# Patient Record
Sex: MALE | Race: WHITE | Employment: FULL TIME | URBAN - METROPOLITAN AREA
[De-identification: names, ages, dates, MRNs, and addresses within clinical notes are randomized per-mention and may not be internally consistent; named-entity substitution may affect disease eponyms.]

---

## 2021-05-02 ENCOUNTER — APPOINTMENT (OUTPATIENT)
Dept: GENERAL RADIOLOGY | Age: 51
DRG: 177 | End: 2021-05-02
Attending: EMERGENCY MEDICINE
Payer: COMMERCIAL

## 2021-05-02 ENCOUNTER — HOSPITAL ENCOUNTER (INPATIENT)
Age: 51
LOS: 1 days | Discharge: ANOTHER HOSPITAL | DRG: 177 | End: 2021-05-03
Attending: EMERGENCY MEDICINE | Admitting: HOSPITALIST
Payer: COMMERCIAL

## 2021-05-02 DIAGNOSIS — U07.1 PNEUMONIA DUE TO COVID-19 VIRUS: Primary | ICD-10-CM

## 2021-05-02 DIAGNOSIS — J12.82 PNEUMONIA DUE TO COVID-19 VIRUS: Primary | ICD-10-CM

## 2021-05-02 PROBLEM — J96.01 ACUTE RESPIRATORY FAILURE WITH HYPOXIA (HCC): Status: ACTIVE | Noted: 2021-05-02

## 2021-05-02 PROBLEM — J96.00 ACUTE RESPIRATORY FAILURE DUE TO COVID-19 (HCC): Status: ACTIVE | Noted: 2021-05-02

## 2021-05-02 PROBLEM — I10 ESSENTIAL HYPERTENSION: Status: ACTIVE | Noted: 2021-05-02

## 2021-05-02 LAB
ABO + RH BLD: NORMAL
ALBUMIN SERPL-MCNC: 3.5 G/DL (ref 3.5–5)
ALBUMIN/GLOB SERPL: 0.9 {RATIO} (ref 1.2–3.5)
ALP SERPL-CCNC: 89 U/L (ref 50–136)
ALT SERPL-CCNC: 104 U/L (ref 12–65)
ANION GAP SERPL CALC-SCNC: 6 MMOL/L (ref 7–16)
AST SERPL-CCNC: 186 U/L (ref 15–37)
BASOPHILS # BLD: 0 K/UL (ref 0–0.2)
BASOPHILS NFR BLD: 0 % (ref 0–2)
BILIRUB DIRECT SERPL-MCNC: 0.2 MG/DL
BILIRUB SERPL-MCNC: 0.9 MG/DL (ref 0.2–1.1)
BLOOD GROUP ANTIBODIES SERPL: NORMAL
BUN SERPL-MCNC: 12 MG/DL (ref 6–23)
CALCIUM SERPL-MCNC: 8.3 MG/DL (ref 8.3–10.4)
CHLORIDE SERPL-SCNC: 105 MMOL/L (ref 98–107)
CO2 SERPL-SCNC: 27 MMOL/L (ref 21–32)
COVID-19 RAPID TEST, COVR: DETECTED
CREAT SERPL-MCNC: 0.86 MG/DL (ref 0.8–1.5)
CRP SERPL-MCNC: 11.5 MG/DL (ref 0–0.9)
D DIMER PPP FEU-MCNC: 0.57 UG/ML(FEU)
DIFFERENTIAL METHOD BLD: ABNORMAL
EOSINOPHIL # BLD: 0 K/UL (ref 0–0.8)
EOSINOPHIL NFR BLD: 0 % (ref 0.5–7.8)
ERYTHROCYTE [DISTWIDTH] IN BLOOD BY AUTOMATED COUNT: 13.8 % (ref 11.9–14.6)
GLOBULIN SER CALC-MCNC: 4.1 G/DL (ref 2.3–3.5)
GLUCOSE SERPL-MCNC: 110 MG/DL (ref 65–100)
HCT VFR BLD AUTO: 45.2 % (ref 41.1–50.3)
HGB BLD-MCNC: 15.1 G/DL (ref 13.6–17.2)
IMM GRANULOCYTES # BLD AUTO: 0 K/UL (ref 0–0.5)
IMM GRANULOCYTES NFR BLD AUTO: 0 % (ref 0–5)
LACTATE SERPL-SCNC: 0.9 MMOL/L (ref 0.4–2)
LYMPHOCYTES # BLD: 1.8 K/UL (ref 0.5–4.6)
LYMPHOCYTES NFR BLD: 24 % (ref 13–44)
MCH RBC QN AUTO: 29 PG (ref 26.1–32.9)
MCHC RBC AUTO-ENTMCNC: 33.4 G/DL (ref 31.4–35)
MCV RBC AUTO: 86.9 FL (ref 79.6–97.8)
MONOCYTES # BLD: 0.5 K/UL (ref 0.1–1.3)
MONOCYTES NFR BLD: 6 % (ref 4–12)
NEUTS SEG # BLD: 5.4 K/UL (ref 1.7–8.2)
NEUTS SEG NFR BLD: 70 % (ref 43–78)
NRBC # BLD: 0 K/UL (ref 0–0.2)
PLATELET # BLD AUTO: 186 K/UL (ref 150–450)
PMV BLD AUTO: 10 FL (ref 9.4–12.3)
POTASSIUM SERPL-SCNC: 4.1 MMOL/L (ref 3.5–5.1)
PROCALCITONIN SERPL-MCNC: 0.08 NG/ML
PROT SERPL-MCNC: 7.6 G/DL (ref 6.3–8.2)
RBC # BLD AUTO: 5.2 M/UL (ref 4.23–5.6)
SARS-COV-2, COV2: NORMAL
SODIUM SERPL-SCNC: 138 MMOL/L (ref 136–145)
SOURCE, COVRS: ABNORMAL
SPECIMEN EXP DATE BLD: NORMAL
WBC # BLD AUTO: 7.7 K/UL (ref 4.3–11.1)

## 2021-05-02 PROCEDURE — 74011250637 HC RX REV CODE- 250/637: Performed by: HOSPITALIST

## 2021-05-02 PROCEDURE — 83605 ASSAY OF LACTIC ACID: CPT

## 2021-05-02 PROCEDURE — 71045 X-RAY EXAM CHEST 1 VIEW: CPT

## 2021-05-02 PROCEDURE — 85025 COMPLETE CBC W/AUTO DIFF WBC: CPT

## 2021-05-02 PROCEDURE — 93005 ELECTROCARDIOGRAM TRACING: CPT | Performed by: EMERGENCY MEDICINE

## 2021-05-02 PROCEDURE — 85379 FIBRIN DEGRADATION QUANT: CPT

## 2021-05-02 PROCEDURE — 65270000029 HC RM PRIVATE

## 2021-05-02 PROCEDURE — 80076 HEPATIC FUNCTION PANEL: CPT

## 2021-05-02 PROCEDURE — 99285 EMERGENCY DEPT VISIT HI MDM: CPT

## 2021-05-02 PROCEDURE — 84145 PROCALCITONIN (PCT): CPT

## 2021-05-02 PROCEDURE — XW033E5 INTRODUCTION OF REMDESIVIR ANTI-INFECTIVE INTO PERIPHERAL VEIN, PERCUTANEOUS APPROACH, NEW TECHNOLOGY GROUP 5: ICD-10-PCS | Performed by: HOSPITALIST

## 2021-05-02 PROCEDURE — 86140 C-REACTIVE PROTEIN: CPT

## 2021-05-02 PROCEDURE — 74011250636 HC RX REV CODE- 250/636: Performed by: EMERGENCY MEDICINE

## 2021-05-02 PROCEDURE — 80048 BASIC METABOLIC PNL TOTAL CA: CPT

## 2021-05-02 PROCEDURE — 96374 THER/PROPH/DIAG INJ IV PUSH: CPT

## 2021-05-02 PROCEDURE — 87635 SARS-COV-2 COVID-19 AMP PRB: CPT

## 2021-05-02 PROCEDURE — 86901 BLOOD TYPING SEROLOGIC RH(D): CPT

## 2021-05-02 RX ORDER — FAMOTIDINE 20 MG/1
20 TABLET, FILM COATED ORAL 2 TIMES DAILY
Status: DISCONTINUED | OUTPATIENT
Start: 2021-05-02 | End: 2021-05-03 | Stop reason: HOSPADM

## 2021-05-02 RX ORDER — SODIUM CHLORIDE 0.9 % (FLUSH) 0.9 %
5-40 SYRINGE (ML) INJECTION AS NEEDED
Status: DISCONTINUED | OUTPATIENT
Start: 2021-05-02 | End: 2021-05-03 | Stop reason: HOSPADM

## 2021-05-02 RX ORDER — FAMOTIDINE 20 MG/1
20 TABLET, FILM COATED ORAL 2 TIMES DAILY
Status: CANCELLED | OUTPATIENT
Start: 2021-05-02

## 2021-05-02 RX ORDER — ALBUTEROL SULFATE 90 UG/1
2 AEROSOL, METERED RESPIRATORY (INHALATION)
Status: DISCONTINUED | OUTPATIENT
Start: 2021-05-03 | End: 2021-05-03 | Stop reason: SDUPTHER

## 2021-05-02 RX ORDER — ENOXAPARIN SODIUM 100 MG/ML
40 INJECTION SUBCUTANEOUS EVERY 12 HOURS
Status: DISCONTINUED | OUTPATIENT
Start: 2021-05-03 | End: 2021-05-03

## 2021-05-02 RX ORDER — DEXAMETHASONE SODIUM PHOSPHATE 100 MG/10ML
6 INJECTION INTRAMUSCULAR; INTRAVENOUS
Status: COMPLETED | OUTPATIENT
Start: 2021-05-02 | End: 2021-05-02

## 2021-05-02 RX ORDER — MELATONIN
2000 DAILY
Status: DISCONTINUED | OUTPATIENT
Start: 2021-05-03 | End: 2021-05-03 | Stop reason: HOSPADM

## 2021-05-02 RX ORDER — SODIUM CHLORIDE 9 MG/ML
250 INJECTION, SOLUTION INTRAVENOUS AS NEEDED
Status: DISCONTINUED | OUTPATIENT
Start: 2021-05-02 | End: 2021-05-03 | Stop reason: HOSPADM

## 2021-05-02 RX ORDER — GUAIFENESIN/DEXTROMETHORPHAN 100-10MG/5
5 SYRUP ORAL
Status: DISCONTINUED | OUTPATIENT
Start: 2021-05-02 | End: 2021-05-03 | Stop reason: HOSPADM

## 2021-05-02 RX ORDER — GUAIFENESIN 600 MG/1
600 TABLET, EXTENDED RELEASE ORAL EVERY 12 HOURS
Status: CANCELLED | OUTPATIENT
Start: 2021-05-02

## 2021-05-02 RX ORDER — SODIUM CHLORIDE 0.9 % (FLUSH) 0.9 %
5-40 SYRINGE (ML) INJECTION EVERY 8 HOURS
Status: DISCONTINUED | OUTPATIENT
Start: 2021-05-02 | End: 2021-05-03 | Stop reason: HOSPADM

## 2021-05-02 RX ORDER — SODIUM CHLORIDE 0.9 % (FLUSH) 0.9 %
5-40 SYRINGE (ML) INJECTION EVERY 8 HOURS
Status: CANCELLED | OUTPATIENT
Start: 2021-05-02

## 2021-05-02 RX ORDER — PROMETHAZINE HYDROCHLORIDE 25 MG/1
12.5 TABLET ORAL
Status: DISCONTINUED | OUTPATIENT
Start: 2021-05-02 | End: 2021-05-03 | Stop reason: HOSPADM

## 2021-05-02 RX ORDER — DEXAMETHASONE SODIUM PHOSPHATE 100 MG/10ML
6 INJECTION INTRAMUSCULAR; INTRAVENOUS EVERY 24 HOURS
Status: DISCONTINUED | OUTPATIENT
Start: 2021-05-03 | End: 2021-05-03 | Stop reason: HOSPADM

## 2021-05-02 RX ORDER — LISINOPRIL 20 MG/1
40 TABLET ORAL DAILY
Status: DISCONTINUED | OUTPATIENT
Start: 2021-05-03 | End: 2021-05-03 | Stop reason: HOSPADM

## 2021-05-02 RX ORDER — ACETAMINOPHEN 325 MG/1
650 TABLET ORAL
Status: CANCELLED | OUTPATIENT
Start: 2021-05-02

## 2021-05-02 RX ORDER — ASCORBIC ACID 500 MG
1000 TABLET ORAL 3 TIMES DAILY
Status: CANCELLED | OUTPATIENT
Start: 2021-05-02

## 2021-05-02 RX ORDER — ACETAMINOPHEN 650 MG/1
650 SUPPOSITORY RECTAL
Status: DISCONTINUED | OUTPATIENT
Start: 2021-05-02 | End: 2021-05-03 | Stop reason: HOSPADM

## 2021-05-02 RX ORDER — ACETAMINOPHEN 650 MG/1
650 SUPPOSITORY RECTAL
Status: CANCELLED | OUTPATIENT
Start: 2021-05-02

## 2021-05-02 RX ORDER — UREA 10 %
220 LOTION (ML) TOPICAL DAILY
Status: DISCONTINUED | OUTPATIENT
Start: 2021-05-03 | End: 2021-05-03 | Stop reason: HOSPADM

## 2021-05-02 RX ORDER — ACETAMINOPHEN 325 MG/1
650 TABLET ORAL
Status: DISCONTINUED | OUTPATIENT
Start: 2021-05-02 | End: 2021-05-03 | Stop reason: HOSPADM

## 2021-05-02 RX ORDER — GUAIFENESIN 600 MG/1
600 TABLET, EXTENDED RELEASE ORAL EVERY 12 HOURS
Status: DISCONTINUED | OUTPATIENT
Start: 2021-05-02 | End: 2021-05-03 | Stop reason: HOSPADM

## 2021-05-02 RX ORDER — LISINOPRIL 20 MG/1
40 TABLET ORAL DAILY
Status: CANCELLED | OUTPATIENT
Start: 2021-05-03

## 2021-05-02 RX ORDER — GUAIFENESIN 100 MG/5ML
81 LIQUID (ML) ORAL DAILY
Status: DISCONTINUED | OUTPATIENT
Start: 2021-05-02 | End: 2021-05-03 | Stop reason: HOSPADM

## 2021-05-02 RX ORDER — PROMETHAZINE HYDROCHLORIDE 25 MG/1
12.5 TABLET ORAL
Status: CANCELLED | OUTPATIENT
Start: 2021-05-02

## 2021-05-02 RX ORDER — POLYETHYLENE GLYCOL 3350 17 G/17G
17 POWDER, FOR SOLUTION ORAL DAILY PRN
Status: CANCELLED | OUTPATIENT
Start: 2021-05-02

## 2021-05-02 RX ORDER — UREA 10 %
220 LOTION (ML) TOPICAL DAILY
Status: CANCELLED | OUTPATIENT
Start: 2021-05-03

## 2021-05-02 RX ORDER — POLYETHYLENE GLYCOL 3350 17 G/17G
17 POWDER, FOR SOLUTION ORAL DAILY PRN
Status: DISCONTINUED | OUTPATIENT
Start: 2021-05-02 | End: 2021-05-03 | Stop reason: HOSPADM

## 2021-05-02 RX ORDER — SODIUM CHLORIDE 0.9 % (FLUSH) 0.9 %
5-40 SYRINGE (ML) INJECTION AS NEEDED
Status: CANCELLED | OUTPATIENT
Start: 2021-05-02

## 2021-05-02 RX ORDER — GUAIFENESIN 100 MG/5ML
81 LIQUID (ML) ORAL DAILY
Status: CANCELLED | OUTPATIENT
Start: 2021-05-04

## 2021-05-02 RX ORDER — ONDANSETRON 2 MG/ML
4 INJECTION INTRAMUSCULAR; INTRAVENOUS
Status: DISCONTINUED | OUTPATIENT
Start: 2021-05-02 | End: 2021-05-03 | Stop reason: HOSPADM

## 2021-05-02 RX ORDER — GUAIFENESIN/DEXTROMETHORPHAN 100-10MG/5
5 SYRUP ORAL
Status: CANCELLED | OUTPATIENT
Start: 2021-05-02

## 2021-05-02 RX ORDER — ALBUTEROL SULFATE 90 UG/1
2 AEROSOL, METERED RESPIRATORY (INHALATION)
Status: CANCELLED | OUTPATIENT
Start: 2021-05-03 | Stop reason: SDUPTHER

## 2021-05-02 RX ORDER — ONDANSETRON 2 MG/ML
4 INJECTION INTRAMUSCULAR; INTRAVENOUS
Status: CANCELLED | OUTPATIENT
Start: 2021-05-02

## 2021-05-02 RX ORDER — BUDESONIDE AND FORMOTEROL FUMARATE DIHYDRATE 160; 4.5 UG/1; UG/1
2 AEROSOL RESPIRATORY (INHALATION)
Status: CANCELLED | OUTPATIENT
Start: 2021-05-03 | Stop reason: SDUPTHER

## 2021-05-02 RX ORDER — BUDESONIDE AND FORMOTEROL FUMARATE DIHYDRATE 160; 4.5 UG/1; UG/1
2 AEROSOL RESPIRATORY (INHALATION)
Status: DISCONTINUED | OUTPATIENT
Start: 2021-05-03 | End: 2021-05-03 | Stop reason: SDUPTHER

## 2021-05-02 RX ORDER — ASCORBIC ACID 500 MG
1000 TABLET ORAL 3 TIMES DAILY
Status: DISCONTINUED | OUTPATIENT
Start: 2021-05-02 | End: 2021-05-03 | Stop reason: HOSPADM

## 2021-05-02 RX ORDER — DEXAMETHASONE SODIUM PHOSPHATE 100 MG/10ML
6 INJECTION INTRAMUSCULAR; INTRAVENOUS EVERY 24 HOURS
Status: DISCONTINUED | OUTPATIENT
Start: 2021-05-03 | End: 2021-05-02

## 2021-05-02 RX ORDER — MELATONIN
2000 DAILY
Status: CANCELLED | OUTPATIENT
Start: 2021-05-03

## 2021-05-02 RX ADMIN — GUAIFENESIN 600 MG: 600 TABLET, EXTENDED RELEASE ORAL at 20:33

## 2021-05-02 RX ADMIN — GUAIFENESIN AND DEXTROMETHORPHAN 5 ML: 100; 10 SYRUP ORAL at 23:48

## 2021-05-02 RX ADMIN — ASPIRIN 81 MG: 81 TABLET, CHEWABLE ORAL at 20:34

## 2021-05-02 RX ADMIN — OXYCODONE HYDROCHLORIDE AND ACETAMINOPHEN 1000 MG: 500 TABLET ORAL at 23:47

## 2021-05-02 RX ADMIN — FAMOTIDINE 20 MG: 20 TABLET, FILM COATED ORAL at 20:33

## 2021-05-02 RX ADMIN — PROMETHAZINE HYDROCHLORIDE 12.5 MG: 25 TABLET ORAL at 23:53

## 2021-05-02 RX ADMIN — DEXAMETHASONE SODIUM PHOSPHATE 6 MG: 10 INJECTION INTRAMUSCULAR; INTRAVENOUS at 20:32

## 2021-05-02 NOTE — ED PROVIDER NOTES
41-year-old gentleman presents with concerns about shortness of breath and not feeling well. He says for about a week he has had cough, body aches, fatigue, and shortness of breath. He denies any vomiting or diarrhea. He is also had some fevers and chills. He has not had a Covid vaccine. He has not previously had Covid. He denies any history of diabetes but does have a history of high blood pressure. No other associated symptoms per    Elements of this note were created using speech recognition software. As such, errors of speech recognition may be present. Past Medical History:   Diagnosis Date    Hypertension     Kidney stones     Liver disease        Past Surgical History:   Procedure Laterality Date    HX CHOLECYSTECTOMY           History reviewed. No pertinent family history. Social History     Socioeconomic History    Marital status:      Spouse name: Not on file    Number of children: Not on file    Years of education: Not on file    Highest education level: Not on file   Occupational History    Not on file   Social Needs    Financial resource strain: Not on file    Food insecurity     Worry: Not on file     Inability: Not on file    Transportation needs     Medical: Not on file     Non-medical: Not on file   Tobacco Use    Smoking status: Never Smoker   Substance and Sexual Activity    Alcohol use:  Yes    Drug use: No    Sexual activity: Not on file   Lifestyle    Physical activity     Days per week: Not on file     Minutes per session: Not on file    Stress: Not on file   Relationships    Social connections     Talks on phone: Not on file     Gets together: Not on file     Attends Christian service: Not on file     Active member of club or organization: Not on file     Attends meetings of clubs or organizations: Not on file     Relationship status: Not on file    Intimate partner violence     Fear of current or ex partner: Not on file     Emotionally abused: Not on file     Physically abused: Not on file     Forced sexual activity: Not on file   Other Topics Concern    Not on file   Social History Narrative    Not on file         ALLERGIES: Erythromycin    Review of Systems   Constitutional: Positive for activity change, chills, fatigue and fever. HENT: Negative for congestion, rhinorrhea and sore throat. Eyes: Negative for discharge and redness. Respiratory: Positive for cough and shortness of breath. Negative for wheezing. Cardiovascular: Negative for chest pain and palpitations. Gastrointestinal: Negative for diarrhea, nausea and vomiting. Genitourinary: Negative for dysuria and frequency. Musculoskeletal: Positive for myalgias. Negative for gait problem and joint swelling. Skin: Negative for color change and wound. Allergic/Immunologic: Negative for environmental allergies and food allergies. Neurological: Positive for headaches. Negative for seizures and speech difficulty. Hematological: Negative for adenopathy. Psychiatric/Behavioral: Negative for confusion and dysphoric mood. Vitals:    05/02/21 1813 05/02/21 1814 05/02/21 1821 05/02/21 1823   BP:       Pulse: 96 99 97 96   Resp:       Temp:       SpO2: 93% 91% 92% 94%   Weight:       Height:                Physical Exam  Vitals signs and nursing note reviewed. Constitutional:       Appearance: Normal appearance. HENT:      Head: Normocephalic and atraumatic. Mouth/Throat:      Mouth: Mucous membranes are moist.      Pharynx: No oropharyngeal exudate. Cardiovascular:      Rate and Rhythm: Normal rate and regular rhythm. Pulses: Normal pulses. Heart sounds: Normal heart sounds. Pulmonary:      Effort: Pulmonary effort is normal.      Breath sounds: Normal breath sounds. Abdominal:      General: Bowel sounds are normal.      Palpations: Abdomen is soft. Neurological:      General: No focal deficit present.       Mental Status: He is alert and oriented to person, place, and time. MDM  Number of Diagnoses or Management Options  Diagnosis management comments: O2 sat have been borderline. I will observe.          Procedures

## 2021-05-02 NOTE — ED TRIAGE NOTES
Pt to ED with c/o SOB and fever x 1 week. No prior history of covid. Non- productive cough present. Pt states he's unable to take a full deep breath. Masked.

## 2021-05-03 ENCOUNTER — HOSPITAL ENCOUNTER (INPATIENT)
Age: 51
LOS: 4 days | Discharge: HOME | DRG: 177 | End: 2021-05-07
Attending: FAMILY MEDICINE | Admitting: HOSPITALIST
Payer: COMMERCIAL

## 2021-05-03 VITALS
RESPIRATION RATE: 15 BRPM | BODY MASS INDEX: 37.89 KG/M2 | HEART RATE: 85 BPM | SYSTOLIC BLOOD PRESSURE: 123 MMHG | TEMPERATURE: 98.3 F | WEIGHT: 250 LBS | DIASTOLIC BLOOD PRESSURE: 86 MMHG | HEIGHT: 68 IN | OXYGEN SATURATION: 96 %

## 2021-05-03 LAB
ALBUMIN SERPL-MCNC: 3.4 G/DL (ref 3.5–5)
ALBUMIN/GLOB SERPL: 0.8 {RATIO} (ref 1.2–3.5)
ALP SERPL-CCNC: 88 U/L (ref 50–136)
ALT SERPL-CCNC: 99 U/L (ref 12–65)
ANION GAP SERPL CALC-SCNC: 6 MMOL/L (ref 7–16)
AST SERPL-CCNC: 168 U/L (ref 15–37)
ATRIAL RATE: 101 BPM
BILIRUB SERPL-MCNC: 0.8 MG/DL (ref 0.2–1.1)
BUN SERPL-MCNC: 14 MG/DL (ref 6–23)
CALCIUM SERPL-MCNC: 8.5 MG/DL (ref 8.3–10.4)
CALCULATED P AXIS, ECG09: 50 DEGREES
CALCULATED R AXIS, ECG10: 24 DEGREES
CALCULATED T AXIS, ECG11: 5 DEGREES
CHLORIDE SERPL-SCNC: 105 MMOL/L (ref 98–107)
CO2 SERPL-SCNC: 26 MMOL/L (ref 21–32)
CREAT SERPL-MCNC: 0.88 MG/DL (ref 0.8–1.5)
CRP SERPL-MCNC: 12.5 MG/DL (ref 0–0.9)
D DIMER PPP FEU-MCNC: 0.56 UG/ML(FEU)
DIAGNOSIS, 93000: NORMAL
FERRITIN SERPL-MCNC: 1220 NG/ML (ref 8–388)
FERRITIN SERPL-MCNC: 1279 NG/ML (ref 8–388)
FIBRINOGEN PPP-MCNC: 649 MG/DL (ref 190–501)
FIBRINOGEN PPP-MCNC: 672 MG/DL (ref 190–501)
GLOBULIN SER CALC-MCNC: 4.4 G/DL (ref 2.3–3.5)
GLUCOSE SERPL-MCNC: 152 MG/DL (ref 65–100)
LDH SERPL L TO P-CCNC: 463 U/L (ref 100–190)
LDH SERPL L TO P-CCNC: 467 U/L (ref 100–190)
P-R INTERVAL, ECG05: 140 MS
POTASSIUM SERPL-SCNC: 4.6 MMOL/L (ref 3.5–5.1)
PROT SERPL-MCNC: 7.8 G/DL (ref 6.3–8.2)
Q-T INTERVAL, ECG07: 310 MS
QRS DURATION, ECG06: 78 MS
QTC CALCULATION (BEZET), ECG08: 401 MS
SODIUM SERPL-SCNC: 137 MMOL/L (ref 136–145)
VENTRICULAR RATE, ECG03: 101 BPM

## 2021-05-03 PROCEDURE — 74011250636 HC RX REV CODE- 250/636: Performed by: HOSPITALIST

## 2021-05-03 PROCEDURE — 36415 COLL VENOUS BLD VENIPUNCTURE: CPT

## 2021-05-03 PROCEDURE — 77030027138 HC INCENT SPIROMETER -A

## 2021-05-03 PROCEDURE — 83615 LACTATE (LD) (LDH) ENZYME: CPT

## 2021-05-03 PROCEDURE — 74011250637 HC RX REV CODE- 250/637: Performed by: HOSPITALIST

## 2021-05-03 PROCEDURE — 96376 TX/PRO/DX INJ SAME DRUG ADON: CPT

## 2021-05-03 PROCEDURE — 94664 DEMO&/EVAL PT USE INHALER: CPT

## 2021-05-03 PROCEDURE — 82728 ASSAY OF FERRITIN: CPT

## 2021-05-03 PROCEDURE — 85379 FIBRIN DEGRADATION QUANT: CPT

## 2021-05-03 PROCEDURE — 74011250637 HC RX REV CODE- 250/637: Performed by: FAMILY MEDICINE

## 2021-05-03 PROCEDURE — 80053 COMPREHEN METABOLIC PANEL: CPT

## 2021-05-03 PROCEDURE — 94640 AIRWAY INHALATION TREATMENT: CPT

## 2021-05-03 PROCEDURE — 96375 TX/PRO/DX INJ NEW DRUG ADDON: CPT

## 2021-05-03 PROCEDURE — 77030012341 HC CHMB SPCR OPTC MDI VYRM -A

## 2021-05-03 PROCEDURE — 85384 FIBRINOGEN ACTIVITY: CPT

## 2021-05-03 PROCEDURE — 96372 THER/PROPH/DIAG INJ SC/IM: CPT

## 2021-05-03 PROCEDURE — 74011000250 HC RX REV CODE- 250: Performed by: HOSPITALIST

## 2021-05-03 PROCEDURE — 74011000258 HC RX REV CODE- 258: Performed by: HOSPITALIST

## 2021-05-03 PROCEDURE — 65270000029 HC RM PRIVATE

## 2021-05-03 PROCEDURE — 74011250637 HC RX REV CODE- 250/637: Performed by: INTERNAL MEDICINE

## 2021-05-03 PROCEDURE — 94760 N-INVAS EAR/PLS OXIMETRY 1: CPT

## 2021-05-03 PROCEDURE — 86140 C-REACTIVE PROTEIN: CPT

## 2021-05-03 RX ORDER — SODIUM CHLORIDE 0.9 % (FLUSH) 0.9 %
5-40 SYRINGE (ML) INJECTION EVERY 8 HOURS
Status: DISCONTINUED | OUTPATIENT
Start: 2021-05-03 | End: 2021-05-07

## 2021-05-03 RX ORDER — ASCORBIC ACID 500 MG
1000 TABLET ORAL 3 TIMES DAILY
Status: DISCONTINUED | OUTPATIENT
Start: 2021-05-03 | End: 2021-05-04

## 2021-05-03 RX ORDER — ENOXAPARIN SODIUM 100 MG/ML
40 INJECTION SUBCUTANEOUS EVERY 24 HOURS
Status: DISCONTINUED | OUTPATIENT
Start: 2021-05-04 | End: 2021-05-03 | Stop reason: HOSPADM

## 2021-05-03 RX ORDER — GUAIFENESIN 600 MG/1
600 TABLET, EXTENDED RELEASE ORAL EVERY 12 HOURS
Status: DISCONTINUED | OUTPATIENT
Start: 2021-05-03 | End: 2021-05-07 | Stop reason: HOSPADM

## 2021-05-03 RX ORDER — GUAIFENESIN 100 MG/5ML
81 LIQUID (ML) ORAL DAILY
Status: DISCONTINUED | OUTPATIENT
Start: 2021-05-04 | End: 2021-05-07 | Stop reason: HOSPADM

## 2021-05-03 RX ORDER — SODIUM CHLORIDE 0.9 % (FLUSH) 0.9 %
5-40 SYRINGE (ML) INJECTION AS NEEDED
Status: DISCONTINUED | OUTPATIENT
Start: 2021-05-03 | End: 2021-05-07 | Stop reason: HOSPADM

## 2021-05-03 RX ORDER — BUDESONIDE AND FORMOTEROL FUMARATE DIHYDRATE 160; 4.5 UG/1; UG/1
2 AEROSOL RESPIRATORY (INHALATION)
Status: DISCONTINUED | OUTPATIENT
Start: 2021-05-03 | End: 2021-05-03 | Stop reason: HOSPADM

## 2021-05-03 RX ORDER — TEMAZEPAM 15 MG/1
15 CAPSULE ORAL
Status: DISCONTINUED | OUTPATIENT
Start: 2021-05-03 | End: 2021-05-07 | Stop reason: HOSPADM

## 2021-05-03 RX ORDER — ZINC SULFATE 50(220)MG
220 CAPSULE ORAL DAILY
Status: DISCONTINUED | OUTPATIENT
Start: 2021-05-04 | End: 2021-05-04

## 2021-05-03 RX ORDER — ACETAMINOPHEN 325 MG/1
650 TABLET ORAL
Status: DISCONTINUED | OUTPATIENT
Start: 2021-05-03 | End: 2021-05-07 | Stop reason: HOSPADM

## 2021-05-03 RX ORDER — LISINOPRIL 20 MG/1
40 TABLET ORAL DAILY
Status: DISCONTINUED | OUTPATIENT
Start: 2021-05-04 | End: 2021-05-07 | Stop reason: HOSPADM

## 2021-05-03 RX ORDER — POLYETHYLENE GLYCOL 3350 17 G/17G
17 POWDER, FOR SOLUTION ORAL DAILY PRN
Status: DISCONTINUED | OUTPATIENT
Start: 2021-05-03 | End: 2021-05-07 | Stop reason: HOSPADM

## 2021-05-03 RX ORDER — SODIUM CHLORIDE 0.9 % (FLUSH) 0.9 %
5-40 SYRINGE (ML) INJECTION AS NEEDED
Status: DISCONTINUED | OUTPATIENT
Start: 2021-05-03 | End: 2021-05-07

## 2021-05-03 RX ORDER — ALBUTEROL SULFATE 90 UG/1
2 AEROSOL, METERED RESPIRATORY (INHALATION)
Status: DISCONTINUED | OUTPATIENT
Start: 2021-05-03 | End: 2021-05-03 | Stop reason: HOSPADM

## 2021-05-03 RX ORDER — ACETAMINOPHEN 650 MG/1
650 SUPPOSITORY RECTAL
Status: DISCONTINUED | OUTPATIENT
Start: 2021-05-03 | End: 2021-05-07 | Stop reason: HOSPADM

## 2021-05-03 RX ORDER — GUAIFENESIN/DEXTROMETHORPHAN 100-10MG/5
5 SYRUP ORAL
Status: DISCONTINUED | OUTPATIENT
Start: 2021-05-03 | End: 2021-05-07 | Stop reason: HOSPADM

## 2021-05-03 RX ORDER — ONDANSETRON 2 MG/ML
4 INJECTION INTRAMUSCULAR; INTRAVENOUS
Status: DISCONTINUED | OUTPATIENT
Start: 2021-05-03 | End: 2021-05-07 | Stop reason: HOSPADM

## 2021-05-03 RX ORDER — FAMOTIDINE 20 MG/1
20 TABLET, FILM COATED ORAL 2 TIMES DAILY
Status: DISCONTINUED | OUTPATIENT
Start: 2021-05-03 | End: 2021-05-07 | Stop reason: HOSPADM

## 2021-05-03 RX ORDER — SODIUM CHLORIDE 0.9 % (FLUSH) 0.9 %
5-40 SYRINGE (ML) INJECTION EVERY 8 HOURS
Status: DISCONTINUED | OUTPATIENT
Start: 2021-05-03 | End: 2021-05-07 | Stop reason: HOSPADM

## 2021-05-03 RX ORDER — MELATONIN
2000 DAILY
Status: DISCONTINUED | OUTPATIENT
Start: 2021-05-04 | End: 2021-05-07 | Stop reason: HOSPADM

## 2021-05-03 RX ORDER — PROMETHAZINE HYDROCHLORIDE 25 MG/1
12.5 TABLET ORAL
Status: DISCONTINUED | OUTPATIENT
Start: 2021-05-03 | End: 2021-05-07 | Stop reason: HOSPADM

## 2021-05-03 RX ADMIN — GUAIFENESIN AND DEXTROMETHORPHAN 5 ML: 100; 10 SYRUP ORAL at 08:31

## 2021-05-03 RX ADMIN — Medication 10 ML: at 21:02

## 2021-05-03 RX ADMIN — OXYCODONE HYDROCHLORIDE AND ACETAMINOPHEN 1000 MG: 500 TABLET ORAL at 08:31

## 2021-05-03 RX ADMIN — ENOXAPARIN SODIUM 40 MG: 40 INJECTION SUBCUTANEOUS at 09:12

## 2021-05-03 RX ADMIN — ALBUTEROL SULFATE 2 PUFF: 108 INHALANT RESPIRATORY (INHALATION) at 16:16

## 2021-05-03 RX ADMIN — ALBUTEROL SULFATE 2 PUFF: 108 INHALANT RESPIRATORY (INHALATION) at 10:54

## 2021-05-03 RX ADMIN — GUAIFENESIN 600 MG: 600 TABLET, EXTENDED RELEASE ORAL at 08:31

## 2021-05-03 RX ADMIN — GUAIFENESIN 600 MG: 600 TABLET ORAL at 20:58

## 2021-05-03 RX ADMIN — Medication 220 MG: at 08:31

## 2021-05-03 RX ADMIN — LISINOPRIL 40 MG: 20 TABLET ORAL at 08:31

## 2021-05-03 RX ADMIN — FAMOTIDINE 20 MG: 20 TABLET, FILM COATED ORAL at 20:58

## 2021-05-03 RX ADMIN — REMDESIVIR 200 MG: 100 INJECTION, POWDER, LYOPHILIZED, FOR SOLUTION INTRAVENOUS at 11:41

## 2021-05-03 RX ADMIN — FAMOTIDINE 20 MG: 20 TABLET, FILM COATED ORAL at 08:31

## 2021-05-03 RX ADMIN — BUDESONIDE AND FORMOTEROL FUMARATE DIHYDRATE 2 PUFF: 160; 4.5 AEROSOL RESPIRATORY (INHALATION) at 10:55

## 2021-05-03 RX ADMIN — VITAMIN D, TAB 1000IU (100/BT) 2000 UNITS: 25 TAB at 09:12

## 2021-05-03 RX ADMIN — GUAIFENESIN AND DEXTROMETHORPHAN 5 ML: 100; 10 SYRUP ORAL at 21:01

## 2021-05-03 RX ADMIN — TEMAZEPAM 15 MG: 15 CAPSULE ORAL at 23:27

## 2021-05-03 RX ADMIN — OXYCODONE HYDROCHLORIDE AND ACETAMINOPHEN 1000 MG: 500 TABLET ORAL at 21:01

## 2021-05-03 RX ADMIN — DEXAMETHASONE SODIUM PHOSPHATE 6 MG: 10 INJECTION INTRAMUSCULAR; INTRAVENOUS at 08:29

## 2021-05-03 NOTE — ED NOTES
Bedside handoff report given to Gerardo Hunt RN. Care transferred to [facility] at this time.

## 2021-05-03 NOTE — H&P
HOSPITALIST H&P/CONSULT  NAME:  Marlene Uribe   Age:  53 y.o.  :   1970   MRN:   [id number]  PCP: Orion Dee MD  Consulting MD:  Treatment Team: Attending Provider: Rosa Reis MD; Primary Nurse: Amanuel Chand RN  HPI:   Patient is a 42-year-old male with history of HTN, nephrolithiasis, fatty liver who presented with 1 week history of subjective fever and generalized weakness with muscle aches. Patient reports that symptoms began about a week ago with subjective fevers, feeling hot, intermittent chills that progressed to generalized weakness, fatigue, muscle aches. Over the last 4 to 5 days patient also noticed dry cough and shortness of breath which is mostly with exertion but over the last 24 to 48 hours it is also present at rest.  He denies having any nausea vomiting or diarrhea. Denies chest pain, palpitations, headache, lightheadedness/dizziness, abdominal pain or urinary symptoms. ER work-up was remarkable for right upper lobe infiltrate, SARS-CoV-2 positive on 2021. Blood work was unremarkable. Patient's O2 sat ranging from 91 to 95% on room air at rest but dropped to 88 to 90% with minimal exertion. Complete ROS done and is as stated in HPI or otherwise negative  Past Medical History:   Diagnosis Date    Hypertension     Kidney stones     Liver disease       Past Surgical History:   Procedure Laterality Date    HX CHOLECYSTECTOMY        Prior to Admission Medications   Prescriptions Last Dose Informant Patient Reported? Taking?   lisinopril (PRINIVIL, ZESTRIL) 40 mg tablet   Yes No   Sig: Take 40 mg by mouth daily. ondansetron (ZOFRAN ODT) 8 mg disintegrating tablet   No No   Sig: Take 1 Tab by mouth every twelve (12) hours as needed for Nausea. oxyCODONE-acetaminophen (PERCOCET) 7.5-325 mg per tablet   No No   Sig: Take 1 Tab by mouth every four (4) hours as needed for Pain. Max Daily Amount: 6 Tabs.       Facility-Administered Medications: None     Allergies   Allergen Reactions    Erythromycin Nausea and Vomiting      Social History     Tobacco Use    Smoking status: Never Smoker   Substance Use Topics    Alcohol use: Yes      History reviewed. No pertinent family history. Objective:     Visit Vitals  /75   Pulse 98   Temp 100 °F (37.8 °C)   Resp 24   Ht 5' 8\" (1.727 m)   Wt 113.4 kg (250 lb)   SpO2 93%   BMI 38.01 kg/m²      Temp (24hrs), Av °F (37.8 °C), Min:100 °F (37.8 °C), Max:100 °F (37.8 °C)    Oxygen Therapy  O2 Sat (%): 93 % (21)  Pulse via Oximetry: 98 beats per minute (21)  O2 Device: None (Room air) (21)  Physical Exam:  General:    Nadiya Mcdermott male, obese with BMI 38.01, on 2 L via NC, NAD     Head:   Normocephalic, without obvious abnormality, atraumatic. Nose:  Nares normal. No drainage or sinus tenderness. Lungs:   Coarse breath sounds bilaterally with poor effort, no wheezing or rhonchi, no use of accessory muscles or tachypnea appreciated  Heart:   Regular rate and rhythm,  no murmur, rub or gallop. Abdomen:   Soft, non-tender. Not distended. Bowel sounds normal.   Extremities: No cyanosis. No edema. No clubbing  Skin:     Texture, turgor normal. No rashes or lesions. Not Jaundiced  Neurologic: GCS 15, no motor or sensory deficits, cranial nerves II to XII grossly intact.   Psych:              AO x3, mood and affect flat  Data Review:   Recent Results (from the past 24 hour(s))   CBC WITH AUTOMATED DIFF    Collection Time: 21  5:19 PM   Result Value Ref Range    WBC 7.7 4.3 - 11.1 K/uL    RBC 5.20 4.23 - 5.6 M/uL    HGB 15.1 13.6 - 17.2 g/dL    HCT 45.2 41.1 - 50.3 %    MCV 86.9 79.6 - 97.8 FL    MCH 29.0 26.1 - 32.9 PG    MCHC 33.4 31.4 - 35.0 g/dL    RDW 13.8 11.9 - 14.6 %    PLATELET 216 176 - 736 K/uL    MPV 10.0 9.4 - 12.3 FL    ABSOLUTE NRBC 0.00 0.0 - 0.2 K/uL    DF AUTOMATED      NEUTROPHILS 70 43 - 78 %    LYMPHOCYTES 24 13 - 44 %    MONOCYTES 6 4.0 - 12.0 % EOSINOPHILS 0 (L) 0.5 - 7.8 %    BASOPHILS 0 0.0 - 2.0 %    IMMATURE GRANULOCYTES 0 0.0 - 5.0 %    ABS. NEUTROPHILS 5.4 1.7 - 8.2 K/UL    ABS. LYMPHOCYTES 1.8 0.5 - 4.6 K/UL    ABS. MONOCYTES 0.5 0.1 - 1.3 K/UL    ABS. EOSINOPHILS 0.0 0.0 - 0.8 K/UL    ABS. BASOPHILS 0.0 0.0 - 0.2 K/UL    ABS. IMM. GRANS. 0.0 0.0 - 0.5 K/UL   METABOLIC PANEL, BASIC    Collection Time: 05/02/21  5:19 PM   Result Value Ref Range    Sodium 138 136 - 145 mmol/L    Potassium 4.1 3.5 - 5.1 mmol/L    Chloride 105 98 - 107 mmol/L    CO2 27 21 - 32 mmol/L    Anion gap 6 (L) 7 - 16 mmol/L    Glucose 110 (H) 65 - 100 mg/dL    BUN 12 6 - 23 MG/DL    Creatinine 0.86 0.8 - 1.5 MG/DL    GFR est AA >60 >60 ml/min/1.73m2    GFR est non-AA >60 >60 ml/min/1.73m2    Calcium 8.3 8.3 - 10.4 MG/DL   LACTIC ACID    Collection Time: 05/02/21  5:19 PM   Result Value Ref Range    Lactic acid 0.9 0.4 - 2.0 MMOL/L   SARS-COV-2    Collection Time: 05/02/21  5:22 PM   Result Value Ref Range    SARS-CoV-2 Please find results under separate order     COVID-19 RAPID TEST    Collection Time: 05/02/21  5:22 PM   Result Value Ref Range    Specimen source Nasopharyngeal      COVID-19 rapid test Detected (AA) NOTD       Imaging /Procedures /Studies   Chest X-ray     INDICATION: Shortness of breath and fever     A portable AP view of the chest was obtained.     FINDINGS: There is infiltrate in the right upper lobe. Left lung is clear. The  heart size is normal.  The bony thorax is intact.       IMPRESSION  Right upper lobe infiltrate     Assessment and Plan:      Active Hospital Problems    Diagnosis Date Noted    Pneumonia due to COVID-19 virus 05/02/2021     Priority: 1 - One    Acute respiratory failure with hypoxia ([facility] [address] 05/02/2021     Priority: 2 - Two    Acute respiratory failure due to COVID-19 ([facility] [address] 05/02/2021     Priority: 3 - Three    Essential hypertension 05/02/2021     Priority: 4 - Four       PLAN  · Admit to inpatient in view of COVID-19 pneumonia resulting in acute respiratory failure with hypoxia. #COVID-19 pneumonia resulting in acute respiratory failure with hypoxia:  Droplet plus isolation  Decadron 6 mg IV daily from tomorrow for 9 days, received 1 dose today in ER. Follow-up with inflammatory markers as ordered  Patient has consented for convalescent plasma transfusion. Ordered for type and screen and 1 unit of plasma. Given patient has history of fatty liver disease, will check LFT first before ordering remdesivir. Fenesin DM every 8 hours for cough  Ventolin and Symbicort inhalers  Supplemental oxygen to titrate SPO2 greater than 92%, wean off as able. Incentive spirometry. Patient encouraged for self proning as tolerated  Follow with PCT, will hold antibiotics until then.   Vitamin C, zinc and vitamin D supplementation  Lovenox 30 mg subcu every 12 hours  Aspirin 81 mg p.o. daily    #Hypertension:  BP is optimally controlled, will resume lisinopril 40 mg p.o. daily    #PT and OT eval    #GI prophylaxis with Pepcid    #DVT prophylaxis with Lovenox    Code Status: Full  High risk  Anticipated discharge: Greater than 2 midnights    Signed By: Isabel Soto MD     May 2, 2021

## 2021-05-03 NOTE — PROGRESS NOTES
Patient will not be able to get convalescent plasma due to his blood type which is O. Blood bank only has type A and B plasma. Liver function shows mild elevation of ALT AST but is less than with 5 times the upper limit and hence qualifies for remdesivir. Daily CMP needs to be monitored for any worsening of liver enzymes while being on remdesivir infusion.

## 2021-05-03 NOTE — PROGRESS NOTES
TRANSFER - IN REPORT:    Verbal report received from [facility] Yordan (name) on Asaf Block  being received from [facility] ER (unit) for routine progression of care      Report consisted of patients Situation, Background, Assessment and   Recommendations(SBAR). Information from the following report(s) SBAR, Kardex, Intake/Output, MAR and Recent Results was reviewed with the receiving nurse. Opportunity for questions and clarification was provided. Assessment completed upon patients arrival to unit and care assumed.

## 2021-05-03 NOTE — ED NOTES
Took bedside report from [place], [address] [place] [address]. Pt wife going home. Call bell provided to pt. No needs at this time.

## 2021-05-03 NOTE — ACP (ADVANCE CARE PLANNING)
Advance Care Planning     Advance Care Planning Activator (Inpatient)  Conversation Note      Date of ACP Conversation: 05/03/21     Conversation Conducted with:   Patient with Decision Making Capacity    ACP Activator: Sindy De Anda, [facility] Decision Maker:    Current Designated Health Care Decision Maker:     Click here to complete [address] including selection of the [address] Relationship (ie \"Primary\")  Today we documented Decision Maker(s) consistent with Legal Next of Kin hierarchy. Care Preferences    Ventilation: \"If you were in your present state of health and suddenly became very ill and were unable to breathe on your own, what would your preference be about the use of a ventilator (breathing machine) if it were available to you? \"      If patient would desire the use of a ventilator (breathing machine), answer \"yes\", if not \"no\":yes    \"If your health worsens and it becomes clear that your chance of recovery is unlikely, what would your preference be about the use of a ventilator (breathing machine) if it were available to you? \"     Would the patient desire the use of a ventilator (breathing machine)? YES      Resuscitation  \"CPR works best to restart the heart when there is a sudden event, like a heart attack, in someone who is otherwise healthy. Unfortunately, CPR does not typically restart the heart for people who have serious health conditions or who are very sick. \"    \"In the event your heart stopped as a result of an underlying serious health condition, would you want attempts to be made to restart your heart (answer \"yes\" for attempt to resuscitate) or would you prefer a natural death (answer \"no\" for do not attempt to resuscitate)? \" yes      [] Yes  [x] No   Educated Patient / Sherly Romansh regarding differences between Advance Directives and portable DNR orders.     Length of ACP Conversation in minutes:  5    Conversation Outcomes:  [] ACP discussion completed  [] Existing advance directive reviewed with patient; no changes to patient's previously recorded wishes     [] New Advance Directive completed   [] Portable Do Not Resuscitate prepared for Provider review and signature  [] POLST/POST/MOLST/MOST prepared for Provider review and signature      Follow-up plan:    [] Schedule follow-up conversation to continue planning  [] Referred individual to Provider for additional questions/concerns   [] Advised patient/agent/surrogate to review completed ACP document and update if needed with changes in condition, patient preferences or care setting     [] This note routed to one or more involved healthcare providers               These are patient's current wishes as discussed at bedside today and are not intended to take place of [address]

## 2021-05-03 NOTE — H&P
HOSPITALIST H&P/CONSULT  NAME:  Grisel Rose   Age:  53 y.o.  :   1970   MRN:   [id number]  PCP: Alia Madrigal MD  Consulting MD:  Treatment Team: Attending Provider: Guzman Egan MD  HPI:   Patient is a 52-year-old male with history of HTN, nephrolithiasis, fatty liver who presented with 1 week history of subjective fever and generalized weakness with muscle aches. Patient reports that symptoms began about a week ago with subjective fevers, feeling hot, intermittent chills that progressed to generalized weakness, fatigue, muscle aches. Over the last 4 to 5 days patient also noticed dry cough and shortness of breath which is mostly with exertion but over the last 24 to 48 hours it is also present at rest.  He denies having any nausea vomiting or diarrhea. Denies chest pain, palpitations, headache, lightheadedness/dizziness, abdominal pain or urinary symptoms. ER work-up was remarkable for right upper lobe infiltrate, SARS-CoV-2 positive on 2021. Blood work was unremarkable. Patient's O2 sat ranging from 91 to 95% on room air at rest but dropped to 88 to 90% with minimal exertion. Complete ROS done and is as stated in HPI or otherwise negative  Past Medical History:   Diagnosis Date    Hypertension     Kidney stones     Liver disease       Past Surgical History:   Procedure Laterality Date    HX CHOLECYSTECTOMY        Prior to Admission Medications   Prescriptions Last Dose Informant Patient Reported? Taking?   lisinopril (PRINIVIL, ZESTRIL) 40 mg tablet   Yes No   Sig: Take 40 mg by mouth daily. ondansetron (ZOFRAN ODT) 8 mg disintegrating tablet   No No   Sig: Take 1 Tab by mouth every twelve (12) hours as needed for Nausea. oxyCODONE-acetaminophen (PERCOCET) 7.5-325 mg per tablet   No No   Sig: Take 1 Tab by mouth every four (4) hours as needed for Pain. Max Daily Amount: 6 Tabs.       Facility-Administered Medications: None     Allergies   Allergen Reactions    Erythromycin Nausea and Vomiting      Social History     Tobacco Use    Smoking status: Never Smoker   Substance Use Topics    Alcohol use: Yes      No family history on file. Objective:     Visit Vitals  /69 (BP 1 Location: Left upper arm, BP Patient Position: At rest;Supine)   Pulse 88   Temp 98.4 °F (36.9 °C)   Resp 18   SpO2 93%      Temp (24hrs), Av.3 °F (36.8 °C), Min:97.9 °F (36.6 °C), Max:98.8 °F (37.1 °C)    Oxygen Therapy  O2 Sat (%): 93 % (21)  Physical Exam:  General:    Patsy Oreilly male, obese with BMI 38.01, on 2 L via NC, NAD     Head:   Normocephalic, without obvious abnormality, atraumatic. Nose:  Nares normal. No drainage or sinus tenderness. Lungs:   Coarse breath sounds bilaterally with poor effort, no wheezing or rhonchi, no use of accessory muscles or tachypnea appreciated  Heart:   Regular rate and rhythm,  no murmur, rub or gallop. Abdomen:   Soft, non-tender. Not distended. Bowel sounds normal.   Extremities: No cyanosis. No edema. No clubbing  Skin:     Texture, turgor normal. No rashes or lesions. Not Jaundiced  Neurologic: GCS 15, no motor or sensory deficits, cranial nerves II to XII grossly intact.   Psych:              AO x3, mood and affect flat  Data Review:   Recent Results (from the past 24 hour(s))   TYPE & SCREEN    Collection Time: 21  9:19 PM   Result Value Ref Range    Crossmatch Expiration 2021,2359     ABO/Rh(D) O POSITIVE     Antibody screen NEG    METABOLIC PANEL, COMPREHENSIVE    Collection Time: 21  3:52 AM   Result Value Ref Range    Sodium 137 136 - 145 mmol/L    Potassium 4.6 3.5 - 5.1 mmol/L    Chloride 105 98 - 107 mmol/L    CO2 26 21 - 32 mmol/L    Anion gap 6 (L) 7 - 16 mmol/L    Glucose 152 (H) 65 - 100 mg/dL    BUN 14 6 - 23 MG/DL    Creatinine 0.88 0.8 - 1.5 MG/DL    GFR est AA >60 >60 ml/min/1.73m2    GFR est non-AA >60 >60 ml/min/1.73m2    Calcium 8.5 8.3 - 10.4 MG/DL    Bilirubin, total 0.8 0.2 - 1.1 MG/DL    ALT (SGPT) 99 (H) 12 - 65 U/L    AST (SGOT) 168 (H) 15 - 37 U/L    Alk. phosphatase 88 50 - 136 U/L    Protein, total 7.8 6.3 - 8.2 g/dL    Albumin 3.4 (L) 3.5 - 5.0 g/dL    Globulin 4.4 (H) 2.3 - 3.5 g/dL    A-G Ratio 0.8 (L) 1.2 - 3.5     D DIMER    Collection Time: 05/03/21  3:52 AM   Result Value Ref Range    D DIMER 0.56 (H) <0.56 ug/ml(FEU)   FERRITIN    Collection Time: 05/03/21  3:52 AM   Result Value Ref Range    Ferritin 1,279 (H) 8 - 388 NG/ML   LD    Collection Time: 05/03/21  3:52 AM   Result Value Ref Range     (H) 100 - 190 U/L   C REACTIVE PROTEIN, QT    Collection Time: 05/03/21  3:52 AM   Result Value Ref Range    C-Reactive protein 12.5 (H) 0.0 - 0.9 mg/dL   FIBRINOGEN    Collection Time: 05/03/21  3:52 AM   Result Value Ref Range    Fibrinogen 649 (H) 190 - 501 mg/dL     Imaging /Procedures /Studies   Chest X-ray     INDICATION: Shortness of breath and fever     A portable AP view of the chest was obtained.     FINDINGS: There is infiltrate in the right upper lobe. Left lung is clear. The  heart size is normal.  The bony thorax is intact.       IMPRESSION  Right upper lobe infiltrate     Assessment and Plan: Active Hospital Problems    Diagnosis Date Noted    Essential hypertension 05/02/2021     Priority: 4 - Four    Acute respiratory failure with hypoxia ([address] 05/02/2021    Acute respiratory failure due to COVID-19 [facility]) 05/02/2021    Pneumonia due to COVID-19 virus 05/02/2021       PLAN  · Admit to inpatient in view of COVID-19 pneumonia resulting in acute respiratory failure with hypoxia. #COVID-19 pneumonia resulting in acute respiratory failure with hypoxia:  Droplet plus isolation  Decadron 6 mg IV daily from tomorrow for 9 days, received 1 dose today in ER. Follow-up with inflammatory markers as ordered  Patient has consented for convalescent plasma transfusion. Ordered for type and screen and 1 unit of plasma.   Given patient has history of fatty liver disease, will check LFT first before ordering remdesivir. Fenesin DM every 8 hours for cough  Ventolin and Symbicort inhalers  Supplemental oxygen to titrate SPO2 greater than 92%, wean off as able. Incentive spirometry. Patient encouraged for self proning as tolerated  Follow with PCT, will hold antibiotics until then.   Vitamin C, zinc and vitamin D supplementation  Lovenox 30 mg subcu every 12 hours  Aspirin 81 mg p.o. daily    #Hypertension:  BP is optimally controlled, will resume lisinopril 40 mg p.o. daily    #PT and OT eval    #GI prophylaxis with Pepcid    #DVT prophylaxis with Lovenox    Code Status: Full  High risk  Anticipated discharge: Greater than 2 midnights    Signed By: More David MD     May 3, 2021

## 2021-05-03 NOTE — ED NOTES
Blood bank called to notify this RN that there is no convalescent plasma [facility] that matches the patient's blood type. [facility] only has type A and B plasma available. Hospitalist notified.

## 2021-05-03 NOTE — ED NOTES
TRANSFER - OUT REPORT:    Verbal report given to Fantasma Pantoja on [employer]  being transferred to 5th floor at [facility]. Report consisted of patients Situation, Background, Assessment and   Recommendations(SBAR). Information from the following report(s) SBAR, ED Summary and MAR was reviewed with the receiving nurse. Lines:   Peripheral IV 05/02/21 Right Hand (Active)        Opportunity for questions and clarification was provided.       Patient transported with:   O2 @ 2 liters

## 2021-05-03 NOTE — PROGRESS NOTES
[facility] Hospitalist Progress Note     Name:  Anthony Jha  Age:50 y.o. Sex:male   :  1970    MRN:  [id number]     Admit Date:  2021    Reason for Admission:  Pneumonia due to COVID-19 virus [U07.1, J12.82]  Acute respiratory failure due to COVID-19 ([address] [U07.1, J96.00]  Acute respiratory failure with hypoxia [facility]) [J96.01]    Hospital Course/Interval history:     81-year-old male with history of HTN, nephrolithiasis, fatty liver who presented with 1 week history of subjective fever and generalized weakness with muscle aches. ER work-up was remarkable for right upper lobe infiltrate, SARS-CoV-2 positive on 2021. Blood work was unremarkable. Patient's O2 sat ranging from 91 to 95% on room air at rest but dropped to 88 to 90% with minimal exertion. He was started on Decadron, Remdesivir. Subjective (21):    Pt reports ongoing HOUSER but otherwise feeling ok. Has not really been out of bed much. Laura CP. Review of Systems: 14 point review of systems is otherwise negative with the exception of the elements mentioned above. Assessment & Plan     Acute hypoxic respiratory failure  Covid-19 Pneumonia  Diagnosed . CXR with RUL infiltrate. Procal 0.08.  Ddimer 0.57  - Convalescent plasma ordered on admit  - Dexamethasone through   - Remdesivir for 5 days (start date ) or until otherwise ready for dc - AST just barely under cutoff this AM, monitor and will need to d/c if increases  - ASA 81mg daily  - SSI while on steroids  - wean O2 as tolerated - currently on 2L with SaO2 98%  - monitor renal and hepatic function while on remdesivir    HTN  - cont lisinopril    Diet:  DIET CARDIAC  DIET CARDIAC  DVT PPx: Lovenox BID  Code status: Full Code  Disposition/Expected LOS: likely home in 1-2 days if continues to remain stable      Objective:     Patient Vitals for the past 24 hrs:   Temp Pulse Resp BP SpO2   21 1103     95 %   21 0846  72  123/86 94 % 05/03/21 0534  62   97 %   05/03/21 0459  (!) 59   94 %   05/03/21 0415 98.3 °F (36.8 °C) 60 18 115/75 94 %   05/03/21 0334  62   95 %   05/03/21 0300  67   95 %   05/03/21 0241  72   97 %   05/03/21 0200  69   95 %   05/03/21 0144  83  112/75 97 %   05/02/21 2336 98 °F (36.7 °C) 80  128/77 94 %   05/02/21 2136  91  129/77 95 %   05/02/21 2106 98.8 °F (37.1 °C) 95  126/77 95 %   05/02/21 2006  (!) 104  123/86 97 %   05/02/21 1936  98   93 %   05/02/21 1934  96   93 %   05/02/21 1930  95   93 %   05/02/21 1925  98   93 %   05/02/21 1922  96   95 %   05/02/21 1920  96   93 %   05/02/21 1907  99   93 %   05/02/21 1900  97   93 %   05/02/21 1855  97   92 %   05/02/21 1853  97   91 %   05/02/21 1850  98   93 %   05/02/21 1842  96   92 %   05/02/21 1839  96   92 %   05/02/21 1832  96  132/75 92 %   05/02/21 1823  96   94 %   05/02/21 1821  97   92 %   05/02/21 1814  99   91 %   05/02/21 1813  96   93 %   05/02/21 1808  97   92 %   05/02/21 1806  99   93 %   05/02/21 1800  (!) 101   94 %   05/02/21 1757  97   92 %   05/02/21 1755     91 %   05/02/21 1753  98   94 %   05/02/21 1658 100 °F (37.8 °C) 81 24 126/80 91 %     Oxygen Therapy  O2 Sat (%): 95 % (05/03/21 1103)  Pulse via Oximetry: 76 beats per minute (05/03/21 1103)  O2 Device: None (Room air) (05/02/21 1755)    Body mass index is 38.01 kg/m².     Physical Exam:   General:  No acute distress, speaking in full sentences, no use of accessory muscles   Lungs:  Clear to auscultation bilaterally   CV:  Regular rate and rhythm with normal S1 and S2   Abdomen:  Soft, nontender, nondistended, normoactive bowel sounds   Extremities:  No cyanosis clubbing or edema   Neuro:  Nonfocal, A&O x3   Psych:  Normal affect     Data Review:  I have reviewed all labs, meds, and studies from the last 24 hours:    Labs:    Recent Results (from the past 24 hour(s))   EKG, 12 LEAD, INITIAL Collection Time: 05/02/21  5:05 PM   Result Value Ref Range    Ventricular Rate 101 BPM    Atrial Rate 101 BPM    P-R Interval 140 ms    QRS Duration 78 ms    Q-T Interval 310 ms    QTC Calculation (Bezet) 401 ms    Calculated P Axis 50 degrees    Calculated R Axis 24 degrees    Calculated T Axis 5 degrees    Diagnosis       !! AGE AND GENDER SPECIFIC ECG ANALYSIS !! Sinus tachycardia  Cannot rule out Anterior infarct , age undetermined  Abnormal ECG  No previous ECGs available     CBC WITH AUTOMATED DIFF    Collection Time: 05/02/21  5:19 PM   Result Value Ref Range    WBC 7.7 4.3 - 11.1 K/uL    RBC 5.20 4.23 - 5.6 M/uL    HGB 15.1 13.6 - 17.2 g/dL    HCT 45.2 41.1 - 50.3 %    MCV 86.9 79.6 - 97.8 FL    MCH 29.0 26.1 - 32.9 PG    MCHC 33.4 31.4 - 35.0 g/dL    RDW 13.8 11.9 - 14.6 %    PLATELET 589 586 - 575 K/uL    MPV 10.0 9.4 - 12.3 FL    ABSOLUTE NRBC 0.00 0.0 - 0.2 K/uL    DF AUTOMATED      NEUTROPHILS 70 43 - 78 %    LYMPHOCYTES 24 13 - 44 %    MONOCYTES 6 4.0 - 12.0 %    EOSINOPHILS 0 (L) 0.5 - 7.8 %    BASOPHILS 0 0.0 - 2.0 %    IMMATURE GRANULOCYTES 0 0.0 - 5.0 %    ABS. NEUTROPHILS 5.4 1.7 - 8.2 K/UL    ABS. LYMPHOCYTES 1.8 0.5 - 4.6 K/UL    ABS. MONOCYTES 0.5 0.1 - 1.3 K/UL    ABS. EOSINOPHILS 0.0 0.0 - 0.8 K/UL    ABS. BASOPHILS 0.0 0.0 - 0.2 K/UL    ABS. IMM.  GRANS. 0.0 0.0 - 0.5 K/UL   METABOLIC PANEL, BASIC    Collection Time: 05/02/21  5:19 PM   Result Value Ref Range    Sodium 138 136 - 145 mmol/L    Potassium 4.1 3.5 - 5.1 mmol/L    Chloride 105 98 - 107 mmol/L    CO2 27 21 - 32 mmol/L    Anion gap 6 (L) 7 - 16 mmol/L    Glucose 110 (H) 65 - 100 mg/dL    BUN 12 6 - 23 MG/DL    Creatinine 0.86 0.8 - 1.5 MG/DL    GFR est AA >60 >60 ml/min/1.73m2    GFR est non-AA >60 >60 ml/min/1.73m2    Calcium 8.3 8.3 - 10.4 MG/DL   LACTIC ACID    Collection Time: 05/02/21  5:19 PM   Result Value Ref Range    Lactic acid 0.9 0.4 - 2.0 MMOL/L   D DIMER    Collection Time: 05/02/21  5:19 PM   Result Value Ref Range D DIMER 0.57 (H) <0.56 ug/ml(FEU)   C REACTIVE PROTEIN, QT    Collection Time: 05/02/21  5:19 PM   Result Value Ref Range    C-Reactive protein 11.5 (H) 0.0 - 0.9 mg/dL   PROCALCITONIN    Collection Time: 05/02/21  5:19 PM   Result Value Ref Range    Procalcitonin 0.08 ng/mL   HEPATIC FUNCTION PANEL    Collection Time: 05/02/21  5:19 PM   Result Value Ref Range    Protein, total 7.6 6.3 - 8.2 g/dL    Albumin 3.5 3.5 - 5.0 g/dL    Globulin 4.1 (H) 2.3 - 3.5 g/dL    A-G Ratio 0.9 (L) 1.2 - 3.5      Bilirubin, total 0.9 0.2 - 1.1 MG/DL    Bilirubin, direct 0.2 <0.4 MG/DL    Alk. phosphatase 89 50 - 136 U/L    AST (SGOT) 186 (H) 15 - 37 U/L    ALT (SGPT) 104 (H) 12 - 65 U/L   SARS-COV-2    Collection Time: 05/02/21  5:22 PM   Result Value Ref Range    SARS-CoV-2 Please find results under separate order     COVID-19 RAPID TEST    Collection Time: 05/02/21  5:22 PM   Result Value Ref Range    Specimen source Nasopharyngeal      COVID-19 rapid test Detected (AA) NOTD     TYPE & SCREEN    Collection Time: 05/02/21  9:19 PM   Result Value Ref Range    Crossmatch Expiration 05/05/2021,2359     ABO/Rh(D) O POSITIVE     Antibody screen NEG    METABOLIC PANEL, COMPREHENSIVE    Collection Time: 05/03/21  3:52 AM   Result Value Ref Range    Sodium 137 136 - 145 mmol/L    Potassium 4.6 3.5 - 5.1 mmol/L    Chloride 105 98 - 107 mmol/L    CO2 26 21 - 32 mmol/L    Anion gap 6 (L) 7 - 16 mmol/L    Glucose 152 (H) 65 - 100 mg/dL    BUN 14 6 - 23 MG/DL    Creatinine 0.88 0.8 - 1.5 MG/DL    GFR est AA >60 >60 ml/min/1.73m2    GFR est non-AA >60 >60 ml/min/1.73m2    Calcium 8.5 8.3 - 10.4 MG/DL    Bilirubin, total 0.8 0.2 - 1.1 MG/DL    ALT (SGPT) 99 (H) 12 - 65 U/L    AST (SGOT) 168 (H) 15 - 37 U/L    Alk.  phosphatase 88 50 - 136 U/L    Protein, total 7.8 6.3 - 8.2 g/dL    Albumin 3.4 (L) 3.5 - 5.0 g/dL    Globulin 4.4 (H) 2.3 - 3.5 g/dL    A-G Ratio 0.8 (L) 1.2 - 3.5     D DIMER    Collection Time: 05/03/21  3:52 AM   Result Value Ref Range    D DIMER 0.56 (H) <0.56 ug/ml(FEU)   FERRITIN    Collection Time: 05/03/21  3:52 AM   Result Value Ref Range    Ferritin 1,279 (H) 8 - 388 NG/ML   LD    Collection Time: 05/03/21  3:52 AM   Result Value Ref Range     (H) 100 - 190 U/L   C REACTIVE PROTEIN, QT    Collection Time: 05/03/21  3:52 AM   Result Value Ref Range    C-Reactive protein 12.5 (H) 0.0 - 0.9 mg/dL   FIBRINOGEN    Collection Time: 05/03/21  3:52 AM   Result Value Ref Range    Fibrinogen 649 (H) 190 - 501 mg/dL       All Micro Results     Procedure Component Value Units Date/Time    COVID-19 RAPID TEST [[id number]]  (Abnormal) Collected: 05/02/21 1722    Order Status: Completed Specimen: Nasopharyngeal Updated: 05/02/21 1743     Specimen source Nasopharyngeal        COVID-19 rapid test Detected        Comment:      The specimen is POSITIVE for SARS-CoV-2, the novel coronavirus associated with COVID-19. This test has been authorized by the FDA under an Emergency Use Authorization (EUA) for use by authorized laboratories. Fact sheet for Healthcare Providers: ConventionUpdate.co.nz  Fact sheet for Patients: ConventionUpdate.co.nz       Methodology: Isothermal Nucleic Acid Amplification  RESULTS VERIFIED, PHONED TO AND READ BACK BY  CHAVEZ BEGUM 1743 [id number]. [facility]         COVID-19 RAPID TEST [[id number]] Collected: 05/02/21 1719    Order Status: Canceled           EKG Results     Procedure 720 Value Units Date/Time    EKG, 12 LEAD, INITIAL [[id number]] Collected: 05/02/21 1705    Order Status: Completed Updated: 05/03/21 0601     Ventricular Rate 101 BPM      Atrial Rate 101 BPM      P-R Interval 140 ms      QRS Duration 78 ms      Q-T Interval 310 ms      QTC Calculation (Bezet) 401 ms      Calculated P Axis 50 degrees      Calculated R Axis 24 degrees      Calculated T Axis 5 degrees      Diagnosis --     !! AGE AND GENDER SPECIFIC ECG ANALYSIS !!   Sinus tachycardia  Cannot rule out Anterior infarct , age undetermined  Abnormal ECG  No previous ECGs available            Other Studies:  Xr Chest Sngl V    Result Date: 5/2/2021  Chest X-ray INDICATION: Shortness of breath and fever A portable AP view of the chest was obtained. FINDINGS: There is infiltrate in the right upper lobe. Left lung is clear. The heart size is normal.  The bony thorax is intact.       Right upper lobe infiltrate       Current Meds:   Current Facility-Administered Medications   Medication Dose Route Frequency    albuterol (PROVENTIL HFA, VENTOLIN HFA, PROAIR HFA) inhaler 2 Puff  2 Puff Inhalation Q4H RT    budesonide-formoteroL (SYMBICORT) 160-4.5 mcg/actuation HFA inhaler 2 Puff  2 Puff Inhalation BID RT    sodium chloride (NS) flush 5-40 mL  5-40 mL IntraVENous Q8H    sodium chloride (NS) flush 5-40 mL  5-40 mL IntraVENous PRN    lisinopriL (PRINIVIL, ZESTRIL) tablet 40 mg  40 mg Oral DAILY    sodium chloride (NS) flush 5-40 mL  5-40 mL IntraVENous Q8H    sodium chloride (NS) flush 5-40 mL  5-40 mL IntraVENous PRN    acetaminophen (TYLENOL) tablet 650 mg  650 mg Oral Q6H PRN    Or    acetaminophen (TYLENOL) suppository 650 mg  650 mg Rectal Q6H PRN    polyethylene glycol (MIRALAX) packet 17 g  17 g Oral DAILY PRN    promethazine (PHENERGAN) tablet 12.5 mg  12.5 mg Oral Q6H PRN    Or    ondansetron (ZOFRAN) injection 4 mg  4 mg IntraVENous Q6H PRN    enoxaparin (LOVENOX) injection 40 mg  40 mg SubCUTAneous Q12H    famotidine (PEPCID) tablet 20 mg  20 mg Oral BID    guaiFENesin-dextromethorphan (ROBITUSSIN DM) 100-10 mg/5 mL syrup 5 mL  5 mL Oral BID and QHS    cholecalciferol (VITAMIN D3) (1000 Units /25 mcg) tablet 2,000 Units  2,000 Units Oral DAILY    aspirin chewable tablet 81 mg  81 mg Oral DAILY    guaiFENesin ER (MUCINEX) tablet 600 mg  600 mg Oral Q12H    ascorbic acid (vitamin C) (VITAMIN C) tablet 1,000 mg  1,000 mg Oral TID    zinc sulfate tablet 220 mg  220 mg Oral DAILY    dexamethasone (DECADRON) 10 mg/mL injection 6 mg  6 mg IntraVENous Q24H    0.9% sodium chloride infusion 250 mL  250 mL IntraVENous PRN    [START ON 5/4/2021] remdesivir 100 mg in 0.9% sodium chloride 250 mL IVPB  100 mg IntraVENous Q24H     Current Outpatient Medications   Medication Sig    lisinopril (PRINIVIL, ZESTRIL) 40 mg tablet Take 40 mg by mouth daily.  ondansetron (ZOFRAN ODT) 8 mg disintegrating tablet Take 1 Tab by mouth every twelve (12) hours as needed for Nausea.  oxyCODONE-acetaminophen (PERCOCET) 7.5-325 mg per tablet Take 1 Tab by mouth every four (4) hours as needed for Pain. Max Daily Amount: 6 Tabs. Problem List:  Hospital Problems as of 5/3/2021 Never Reviewed          Codes Class Noted - Resolved POA    Acute respiratory failure with hypoxia [facility]) ICD-10-CM: J96.01  ICD-9-CM: 518.81  5/2/2021 - Present Unknown        Acute respiratory failure due to COVID-19 [facility]) ICD-10-CM: U07.1, J96.00  ICD-9-CM: 518.81, 079.89  5/2/2021 - Present Unknown        * (Principal) Pneumonia due to COVID-19 virus ICD-10-CM: U07.1, J12.82  ICD-9-CM: 480.8, 079.89  5/2/2021 - Present Unknown        Essential hypertension ICD-10-CM: I10  ICD-9-CM: 401.9  5/2/2021 - Present Unknown                   Part of this note was written by using a voice dictation software and the note has been proof read but may still contain some grammatical/other typographical errors.     Signed By: Neida Daley MD   [employer] [facility]    May 3, 2021  5:15 PM

## 2021-05-04 PROBLEM — I10 ESSENTIAL HYPERTENSION: Chronic | Status: ACTIVE | Noted: 2021-05-02

## 2021-05-04 LAB
ALBUMIN SERPL-MCNC: 3.1 G/DL (ref 3.5–5)
ALBUMIN/GLOB SERPL: 0.7 {RATIO} (ref 1.2–3.5)
ALP SERPL-CCNC: 80 U/L (ref 50–136)
ALT SERPL-CCNC: 77 U/L (ref 12–65)
ANION GAP SERPL CALC-SCNC: 7 MMOL/L (ref 7–16)
AST SERPL-CCNC: 121 U/L (ref 15–37)
BILIRUB SERPL-MCNC: 0.5 MG/DL (ref 0.2–1.1)
BUN SERPL-MCNC: 20 MG/DL (ref 6–23)
CALCIUM SERPL-MCNC: 8.6 MG/DL (ref 8.3–10.4)
CHLORIDE SERPL-SCNC: 107 MMOL/L (ref 98–107)
CO2 SERPL-SCNC: 25 MMOL/L (ref 21–32)
CREAT SERPL-MCNC: 0.89 MG/DL (ref 0.8–1.5)
CRP SERPL-MCNC: 6.6 MG/DL (ref 0–0.9)
D DIMER PPP FEU-MCNC: 0.44 UG/ML(FEU)
GLOBULIN SER CALC-MCNC: 4.2 G/DL (ref 2.3–3.5)
GLUCOSE SERPL-MCNC: 151 MG/DL (ref 65–100)
POTASSIUM SERPL-SCNC: 4.6 MMOL/L (ref 3.5–5.1)
PROT SERPL-MCNC: 7.3 G/DL (ref 6.3–8.2)
SODIUM SERPL-SCNC: 139 MMOL/L (ref 136–145)

## 2021-05-04 PROCEDURE — 97165 OT EVAL LOW COMPLEX 30 MIN: CPT

## 2021-05-04 PROCEDURE — 97110 THERAPEUTIC EXERCISES: CPT

## 2021-05-04 PROCEDURE — 94760 N-INVAS EAR/PLS OXIMETRY 1: CPT

## 2021-05-04 PROCEDURE — 86140 C-REACTIVE PROTEIN: CPT

## 2021-05-04 PROCEDURE — 74011250637 HC RX REV CODE- 250/637: Performed by: HOSPITALIST

## 2021-05-04 PROCEDURE — 80053 COMPREHEN METABOLIC PANEL: CPT

## 2021-05-04 PROCEDURE — 77010033678 HC OXYGEN DAILY

## 2021-05-04 PROCEDURE — 97161 PT EVAL LOW COMPLEX 20 MIN: CPT

## 2021-05-04 PROCEDURE — 74011250637 HC RX REV CODE- 250/637: Performed by: INTERNAL MEDICINE

## 2021-05-04 PROCEDURE — 97535 SELF CARE MNGMENT TRAINING: CPT

## 2021-05-04 PROCEDURE — 36415 COLL VENOUS BLD VENIPUNCTURE: CPT

## 2021-05-04 PROCEDURE — 65270000029 HC RM PRIVATE

## 2021-05-04 PROCEDURE — 85379 FIBRIN DEGRADATION QUANT: CPT

## 2021-05-04 PROCEDURE — 74011250636 HC RX REV CODE- 250/636: Performed by: INTERNAL MEDICINE

## 2021-05-04 RX ORDER — ENOXAPARIN SODIUM 100 MG/ML
40 INJECTION SUBCUTANEOUS EVERY 24 HOURS
Status: DISCONTINUED | OUTPATIENT
Start: 2021-05-04 | End: 2021-05-07 | Stop reason: HOSPADM

## 2021-05-04 RX ORDER — ONDANSETRON 2 MG/ML
4 INJECTION INTRAMUSCULAR; INTRAVENOUS ONCE
Status: COMPLETED | OUTPATIENT
Start: 2021-05-04 | End: 2021-05-04

## 2021-05-04 RX ORDER — DEXAMETHASONE 4 MG/1
6 TABLET ORAL DAILY
Status: DISCONTINUED | OUTPATIENT
Start: 2021-05-04 | End: 2021-05-04

## 2021-05-04 RX ORDER — DEXAMETHASONE 4 MG/1
6 TABLET ORAL DAILY
Status: DISCONTINUED | OUTPATIENT
Start: 2021-05-04 | End: 2021-05-07 | Stop reason: HOSPADM

## 2021-05-04 RX ADMIN — Medication 10 ML: at 06:22

## 2021-05-04 RX ADMIN — VITAMIN D, TAB 1000IU (100/BT) 2000 UNITS: 25 TAB at 09:17

## 2021-05-04 RX ADMIN — Medication 200 MG: at 09:17

## 2021-05-04 RX ADMIN — Medication 1 AMPULE: at 09:13

## 2021-05-04 RX ADMIN — Medication 10 ML: at 11:20

## 2021-05-04 RX ADMIN — DEXAMETHASONE 6 MG: 4 TABLET ORAL at 11:57

## 2021-05-04 RX ADMIN — GUAIFENESIN 600 MG: 600 TABLET ORAL at 09:14

## 2021-05-04 RX ADMIN — Medication 10 ML: at 21:52

## 2021-05-04 RX ADMIN — FAMOTIDINE 20 MG: 20 TABLET, FILM COATED ORAL at 17:13

## 2021-05-04 RX ADMIN — ONDANSETRON 4 MG: 2 INJECTION INTRAMUSCULAR; INTRAVENOUS at 11:59

## 2021-05-04 RX ADMIN — ENOXAPARIN SODIUM 40 MG: 40 INJECTION SUBCUTANEOUS at 11:58

## 2021-05-04 RX ADMIN — ASPIRIN 81 MG: 81 TABLET, CHEWABLE ORAL at 09:15

## 2021-05-04 RX ADMIN — OXYCODONE HYDROCHLORIDE AND ACETAMINOPHEN 1000 MG: 500 TABLET ORAL at 09:14

## 2021-05-04 RX ADMIN — Medication 1 AMPULE: at 03:01

## 2021-05-04 RX ADMIN — FAMOTIDINE 20 MG: 20 TABLET, FILM COATED ORAL at 09:14

## 2021-05-04 RX ADMIN — GUAIFENESIN 600 MG: 600 TABLET ORAL at 21:51

## 2021-05-04 RX ADMIN — Medication 1 AMPULE: at 21:51

## 2021-05-04 RX ADMIN — LISINOPRIL 40 MG: 20 TABLET ORAL at 09:14

## 2021-05-04 RX ADMIN — GUAIFENESIN AND DEXTROMETHORPHAN 5 ML: 100; 10 SYRUP ORAL at 09:13

## 2021-05-04 RX ADMIN — GUAIFENESIN AND DEXTROMETHORPHAN 5 ML: 100; 10 SYRUP ORAL at 21:51

## 2021-05-04 RX ADMIN — GUAIFENESIN AND DEXTROMETHORPHAN 5 ML: 100; 10 SYRUP ORAL at 17:12

## 2021-05-04 NOTE — PROGRESS NOTES
Hospitalist Progress Note     Admit Date:  5/3/2021  5:31 PM   Name:  Zoë Lee   Age:  53 y.o.  :  1970   MRN:  [id number]   PCP:  Tegan Lee MD  Presenting Complaint: No chief complaint on file. Initial Admission Diagnosis: Pneumonia due to COVID-19 virus [U07.1, J12.82]  Acute respiratory failure due to COVID-19 (HCC) [U07.1, J96.00]  Acute respiratory failure with hypoxia ([address] [J96.01]     Assessment and Plan:     Hospital Problems as of 2021 Never Reviewed          Codes Class Noted - Resolved POA    Acute respiratory failure with hypoxia [facility]) ICD-10-CM: J96.01  ICD-9-CM: 518.81  2021 - Present Yes        * (Principal) Acute respiratory failure due to COVID-19 [facility]) ICD-10-CM: U07.1, J96.00  ICD-9-CM: 518.81, 079.89  2021 - Present Yes        Pneumonia due to COVID-19 virus ICD-10-CM: U07.1, J12.82  ICD-9-CM: 480.8, 079.89  2021 - Present Yes        Essential hypertension (Chronic) ICD-10-CM: I10  ICD-9-CM: 401.9  2021 - Present Yes              Plan:  COVID  -decadron not ordered; order placed. EOT   -stopped zinc/vit C.  Evidence does not support and not recommended by expert groups  -wean o2 as able  -lovenox dvt ppx    Nausea  -likely from above  -zofran    HTN  -cont home meds    DC planning:    -home when improved    Other listed chronic conditions stable, continue current management. Diet:  DIET CARDIAC      Hospital Course:   81-year-old male with history of HTN, nephrolithiasis, fatty liver who presented with 1 week history of subjective fever and generalized weakness with muscle aches. ER work-up was remarkable for right upper lobe infiltrate, SARS-CoV-2 positive on 2021. [employer] work was unremarkable.  Patient's O2 sat ranging from 91 to 95% on room air at rest but dropped to 88 to 90% with minimal exertion. He was started on Decadron. remdesivir not appropriate given >6d from symptom onset. Plasma not recommended anymore.     24hr Events/Subjective:   Pt feels a little better, less SOB than yesterday. However has some nausea today. No vomiting or abd pain. No fevers, CP. No other complaints  Objective:     Patient Vitals for the past 24 hrs:   Temp Pulse Resp BP SpO2   05/04/21 0845 97.9 °F (36.6 °C) 72 18 136/74 97 %   05/04/21 0358 97.7 °F (36.5 °C) 72 18 105/74 94 %   05/03/21 2321 98.4 °F (36.9 °C) 89 18 132/73 92 %   05/03/21 2017     96 %   05/03/21 1924     93 %   05/03/21 1921 98.4 °F (36.9 °C) 88 18 139/69 93 %   05/03/21 1728 97.9 °F (36.6 °C) 92 16 132/76 96 %     Oxygen Therapy  O2 Sat (%): 97 % (05/04/21 0845)  Pulse via Oximetry: 98 beats per minute (05/03/21 2017)  O2 Device: Nasal cannula (05/03/21 2017)  O2 Flow Rate (L/min): 3 l/min (05/03/21 2017)    Estimated body mass index is 38.01 kg/m² as calculated from the following:    Height as of this encounter: 5' 8\" (1.727 m). Weight as of this encounter: 113.4 kg (250 lb). No intake or output data in the 24 hours ending 05/04/21 1045    *Note that automatically entered I/Os may not be accurate; dependent on patient compliance with collection and accurate  by [employer]. General:    Well nourished. No overt distress  CV:   RRR. No edema. No JVD  Lungs:   Even, Unlabored  Abdomen:   nondistended. Extremities: Warm and dry. No cyanosis   Skin:     No rashes. Normal coloration  Neuro:  No gross focal deficits.      I have reviewed all labs, meds, and other studies shown below:  Last 24hr Labs:  Recent Results (from the past 24 hour(s))   FERRITIN    Collection Time: 05/03/21  7:53 PM   Result Value Ref Range    Ferritin 1,220 (H) 8 - 388 NG/ML   FIBRINOGEN    Collection Time: 05/03/21  7:53 PM   Result Value Ref Range    Fibrinogen 672 (H) 190 - 501 mg/dL   LD    Collection Time: 05/03/21  7:53 PM   Result Value Ref Range     (H) 100 - 190 U/L   C REACTIVE PROTEIN, QT    Collection Time: 05/04/21  7:31 AM   Result Value Ref Range    C-Reactive protein 6.6 (H) 0.0 - 0.9 mg/dL   D DIMER    Collection Time: 05/04/21  7:31 AM   Result Value Ref Range    D DIMER 0.44 <0.56 ug/ml(FEU)   METABOLIC PANEL, COMPREHENSIVE    Collection Time: 05/04/21  7:31 AM   Result Value Ref Range    Sodium 139 136 - 145 mmol/L    Potassium 4.6 3.5 - 5.1 mmol/L    Chloride 107 98 - 107 mmol/L    CO2 25 21 - 32 mmol/L    Anion gap 7 7 - 16 mmol/L    Glucose 151 (H) 65 - 100 mg/dL    BUN 20 6 - 23 MG/DL    Creatinine 0.89 0.8 - 1.5 MG/DL    GFR est AA >60 >60 ml/min/1.73m2    GFR est non-AA >60 >60 ml/min/1.73m2    Calcium 8.6 8.3 - 10.4 MG/DL    Bilirubin, total 0.5 0.2 - 1.1 MG/DL    ALT (SGPT) 77 (H) 12 - 65 U/L    AST (SGOT) 121 (H) 15 - 37 U/L    Alk.  phosphatase 80 50 - 136 U/L    Protein, total 7.3 6.3 - 8.2 g/dL    Albumin 3.1 (L) 3.5 - 5.0 g/dL    Globulin 4.2 (H) 2.3 - 3.5 g/dL    A-G Ratio 0.7 (L) 1.2 - 3.5         All Micro Results     None          SARS-CoV-2 Lab Results  \"Novel Coronavirus\" Test: No results found for: COV2NT   \"Emergent Disease\" Test: No results found for: EDPR  \"SARS-COV-2\" Test: No results found for: XGCOVT  Rapid Test:   Lab Results   Component Value Date/Time    COVR Detected (AA) 05/02/2021 05:22 PM            Current Meds:  Current Facility-Administered Medications   Medication Dose Route Frequency    alcohol 62% (NOZIN) nasal  1 Ampule  1 Ampule Topical Q12H    dexAMETHasone (DECADRON) tablet 6 mg  6 mg Oral DAILY    enoxaparin (LOVENOX) injection 40 mg  40 mg SubCUTAneous Q24H    sodium chloride (NS) flush 5-40 mL  5-40 mL IntraVENous Q8H    sodium chloride (NS) flush 5-40 mL  5-40 mL IntraVENous PRN    acetaminophen (TYLENOL) tablet 650 mg  650 mg Oral Q6H PRN    Or    acetaminophen (TYLENOL) suppository 650 mg  650 mg Rectal Q6H PRN    cholecalciferol (VITAMIN D3) (1000 Units /25 mcg) tablet 2,000 Units  2,000 Units Oral DAILY    famotidine (PEPCID) tablet 20 mg  20 mg Oral BID    guaiFENesin-dextromethorphan (ROBITUSSIN DM) 100-10 mg/5 mL syrup 5 mL  5 mL Oral BID and QHS    polyethylene glycol (MIRALAX) packet 17 g  17 g Oral DAILY PRN    promethazine (PHENERGAN) tablet 12.5 mg  12.5 mg Oral Q6H PRN    Or    ondansetron (ZOFRAN) injection 4 mg  4 mg IntraVENous Q6H PRN    aspirin chewable tablet 81 mg  81 mg Oral DAILY    guaiFENesin ER (MUCINEX) tablet 600 mg  600 mg Oral Q12H    lisinopriL (PRINIVIL, ZESTRIL) tablet 40 mg  40 mg Oral DAILY    sodium chloride (NS) flush 5-40 mL  5-40 mL IntraVENous Q8H    sodium chloride (NS) flush 5-40 mL  5-40 mL IntraVENous PRN    temazepam (RESTORIL) capsule 15 mg  15 mg Oral QHS PRN       Other Studies:  No results found for this visit on 05/03/21. No results found.     Signed:  Quan Mix MD

## 2021-05-04 NOTE — PROGRESS NOTES
ACUTE PHYSICAL THERAPY GOALS:  (Developed with and agreed upon by patient and/or caregiver. )  LTG:  (1.)Mr. Ngozi Maddox will ambulate with  INDEPENDENCE for 500+ feet with no device within 7 day(s). (2.)Mr. Ngozi Maddox will perform standing static and dynamic balance activities x 8 minutes with SUPERVISION to improve safety within 7 day(s). PHYSICAL THERAPY ASSESSMENT: Initial Assessment and Daily Note PT Treatment Day # 1      Gulshan Bettencourt is a 53 y.o. male   PRIMARY DIAGNOSIS: Acute respiratory failure due to COVID-19 ([facility]  Pneumonia due to COVID-19 virus [U07.1, J12.82]  Acute respiratory failure due to COVID-19 (HCC) [U07.1, J96.00]  Acute respiratory failure with hypoxia ([facility] [J96.01]       Reason for Referral:     ICD-10: Treatment Diagnosis: Other abnormalities of gait and mobility (R26.89)  INPATIENT: Payor: DERICK / Plan: MELINA SEPULVEDA [place] CYNTHIA / Product Type: CYNTHIA /     ASSESSMENT:     REHAB RECOMMENDATIONS:   Recommendation to date pending progress:  Setting:   No further skilled therapy   Equipment:    None     PRIOR LEVEL OF FUNCTION:  (Prior to Hospitalization) INITIAL/CURRENT LEVEL OF FUNCTION:  (Most Recently Demonstrated)   Bed Mobility:   Independent  Sit to Stand:   Independent  Transfers:   Independent  Gait/Mobility:   Independent Bed Mobility:   Modified Independent  Sit to Stand:   Independent  Transfers:   Independent  Gait/Mobility:   Standby Assistance     ASSESSMENT:  Mr. Ngozi Maddox was in bed on O2 via nasal cannula. 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 Recovered to 80's with rest.  Mr. Ngozi Maddox would benefit from skilled physical therapy (medically necessary) to address his deficits and maximize his function. SUBJECTIVE:   Mr. Ngozi Maddox states, \"I'm really nauseated. 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    Home Environment: Private residence  # Steps to Enter: 2  One/Two Story Residence: Two story  Living Alone: No  Support Systems: Spouse/Significant Other/Partner  OBJECTIVE:     PAIN: VITAL SIGNS: LINES/DRAINS:   Pre Treatment: Pain Screen  Pain Scale 1: Numeric (0 - 10)  Pain Intensity 1: 0  Post Treatment: 0   none  O2 Device: Nasal cannula     GROSS EVALUATION:  B LE's based on mobility Within Functional Limits Abnormal/ Functional Abnormal/ Non-Functional (see comments) Not Tested Comments:   AROM [x] [] [] []    PROM [] [] [] []    Strength [x] [] [] []    Balance [x] [] [] []    Posture [x] [] [] []    Sensation [] [] [] []    Coordination [] [] [] []    Tone [] [] [] []    Edema [] [] [] []    Activity Tolerance [] [] [x] []     [] [] [] []      COGNITION/  PERCEPTION: Intact Impaired   (see comments) Comments:   Orientation [x] []    Vision [x] []    Hearing [x] []    Command Following [x] []    Safety Awareness [x] []     [] []      MOBILITY: I Mod I S SBA CGA Min Mod Max Total  NT x2 Comments:   Bed Mobility    Rolling [] [x] [] [] [] [] [] [] [] [] []    Supine to Sit [] [x] [] [] [] [] [] [] [] [] []    Scooting [] [x] [] [] [] [] [] [] [] [] []    Sit to Supine [] [x] [] [] [] [] [] [] [] [] []    Transfers    Sit to Stand [x] [] [] [] [] [] [] [] [] [] []    Bed to Chair [] [x] [] [] [] [] [] [] [] [] []    Stand to Sit [x] [] [] [] [] [] [] [] [] [] []    I=Independent, Mod I=Modified Independent, S=Supervision, SBA=Standby Assistance, CGA=Contact Guard Assistance,   Min=Minimal Assistance, Mod=Moderate Assistance, Max=Maximal Assistance, Total=Total Assistance, NT=Not Tested  GAIT: I Mod I S SBA CGA Min Mod Max Total  NT x2 Comments:   Level of Assistance [] [] [] [x] [] [] [] [] [] [] []    Distance 100'    DME None    Gait Quality WNL    Weightbearing Status N/A     I=Independent, Mod I=Modified Independent, S=Supervision, SBA=Standby Assistance, CGA=Contact Guard Assistance,   Min=Minimal Assistance, Mod=Moderate Assistance, Max=Maximal Assistance, Total=Total Assistance, NT=Not Tested    [address] [place] [id number] AM-PAC [facility] Form       How much difficulty does the patient currently have. .. Unable A Lot A Little None   1. Turning over in bed (including adjusting bedclothes, sheets and blankets)? [] 1   [] 2   [] 3   [x] 4   2. Sitting down on and standing up from a chair with arms ( e.g., wheelchair, bedside commode, etc.)   [] 1   [] 2   [] 3   [x] 4   3. Moving from lying on back to sitting on the side of the bed? [] 1   [] 2   [] 3   [x] 4   How much help from another person does the patient currently need. .. Total A Lot A Little None   4. Moving to and from a bed to a chair (including a wheelchair)? [] 1   [] 2   [] 3   [x] 4   5. Need to walk in hospital room? [] 1   [] 2   [x] 3   [] 4   6. Climbing 3-5 steps with a railing? [] 1   [] 2   [x] 3   [] 4   © 2007, Trustees of [address] [place] [id number], under license to [employer]. All rights reserved     Score:  Initial: 22 Most Recent: X (Date: -- )    Interpretation of Tool:  Represents activities that are increasingly more difficult (i.e. Bed mobility, Transfers, Gait). PLAN:   FREQUENCY/DURATION: PT Plan of Care: 3 times/week for duration of hospital stay or until stated goals are met, whichever comes first.    PROBLEM LIST:   (Skilled intervention is medically necessary to address:)  1. Decreased Activity Tolerance  2. Increased Pain   INTERVENTIONS PLANNED:   (Benefits and precautions of physical therapy have been discussed with the patient.)  1. Therapeutic Activity  2. Therapeutic Exercise/HEP  3. Neuromuscular Re-education  4. Gait Training  5. Manual Therapy  6. Education     TREATMENT:     EVALUATION: Low Complexity : (Untimed Charge)    TREATMENT:   ($$ Therapeutic Exercises: 8-22 mins    )  Therapeutic Exercise (8 Minutes):  Therapeutic exercises noted below to improve functional activity tolerance and mobility.      TREATMENT GRID:       Date: 5/4/21        Ambulation  Device  assistance 75'  None  SBA        Partial Squats         Hip Abduction/ Adduction Standing         Heel Raises  Standing         Toe Raises Standing         Hip Flexion Standing         Sit to Stand         Key:  R=right, L=left, B=bilaterally, A=active, AA=active assisted, P=passive    AFTER TREATMENT POSITION/PRECAUTIONS:  Bed, Needs within reach and RN notified    INTERDISCIPLINARY COLLABORATION:  RN/PCT and PT/PTA    TOTAL TREATMENT DURATION:  PT Patient Time In/Time Out  Time In: 1127  Time Out: Lou PT, DPT

## 2021-05-04 NOTE — PROGRESS NOTES
ACUTE OT GOALS:  (Developed with and agreed upon by patient and/or caregiver.)  1. Patient will complete lower body dressing with Supervision for monitoring of O2 sats. 2. Patient will complete functional transfers with Supervision for monitoring of O2 sats. 3. Patient will complete standing grooming ADL with Supervision for monitoring of O2 sats. GOALS MET    OCCUPATIONAL THERAPY ASSESSMENT: Initial Assessment, Daily Note, Discharge and PM OT Treatment Day # 1    Gulshan Vazquez is a 53 y.o. male   PRIMARY DIAGNOSIS: Acute respiratory failure due to COVID-19 ([facility]  Pneumonia due to COVID-19 virus [U07.1, J12.82]  Acute respiratory failure due to COVID-19 (HCC) [U07.1, J96.00]  Acute respiratory failure with hypoxia ([facility] [address] [J96.01]       Reason for Referral:  Generalized Weakness  ICD-10: Treatment Diagnosis: Generalized Muscle Weakness (M62.81)  Difficulty in walking, Not elsewhere classified (R26.2)  INPATIENT: Payor: DERICK / Plan: [place] SEPULVED[place] [employer] CYNTHIA / Product Type: CYNTHIA /   ASSESSMENT:     REHAB RECOMMENDATIONS:   Recommendation to date pending progress:  Setting:   No further skilled therapy . Will defer to PT for decreased activity tolerance. Equipment:    None     PRIOR LEVEL OF FUNCTION:  (Prior to Hospitalization)  INITIAL/CURRENT LEVEL OF FUNCTION:  (Based on today's evaluation)   Bathing:   Independent  Dressing:   Independent  Feeding/Grooming:   Independent  Toileting:   Independent  Functional Mobility:   Independent Bathing:   Supervision for O2 sats only. Dressing:   Supervision for O2 sats only. Feeding/Grooming:   Supervision for O2 sats only. Toileting:   Supervision for O2 sats only. Functional Mobility:   Supervision for O2 sats only. ASSESSMENT:  Mr. Lisbeth Mathis was admitted for acute respiratory failure due to Covid 19+. Pt main limiting factor is decreased activity tolerance for performance of ADLs and functional mobility.  Pt is Mod I (with head of bed elevated) to transfer to edge of bed. Pt requires Supervision to complete sock management, sit <> stand transfers, and walk from bed to sink in preparation for grooming ADL. Pt requires Supervision to brush teeth at sink with no loss of balance observed. Pt requires Supervision to complete all functional mobility and ADLs this evaluation/treatment session for monitoring of O2 sats only with O2 sats 93-95% throughout session. Pt is otherwise observed to be independent and does not require any physical assist/cueing to participate in therapy session. At this time, pt is most appropriate to d/c from acute OT caseload with no additional OT needs. Will defer to PT for decreased activity tolerance. SUBJECTIVE:   Mr. Devin Mathias states, \"I just need to get used to these socks. \"    SOCIAL HISTORY/LIVING ENVIRONMENT: Pt lives with spouse and child in two story home with 1 EDI and full flight of steps to access second floor. Pt has bedroom and bath on second level. Pt is independent for performance of ADLs and functional mobility. Pt works in IT and drives.   Home Environment: Private residence  # Steps to Enter: 2  One/Two Story Residence: Two story  Living Alone: No  Support Systems: Spouse/Significant Other/Partner    OBJECTIVE:     PAIN: VITAL SIGNS: LINES/DRAINS:   Pre Treatment: Pain Screen  Pain Scale 1: Numeric (0 - 10)  Pain Intensity 1: 0  Post Treatment: Unchanged Vital Signs  O2 Sat (%): 97 %  O2 Device: Nasal cannula  O2 Flow Rate (L/min): 2 l/min None  O2 Device: Nasal cannula     GROSS EVALUATION:  BUE Within Functional Limits Abnormal/ Functional Abnormal/ Non-Functional (see comments) Not Tested Comments:   AROM [x] [] [] []    PROM [] [] [] [x]    Strength [x] [] [] []    Balance [x] [] [] []    Posture [x] [] [] []    Sensation [x] [] [] []    Coordination [x] [] [] []    Tone [x] [] [] []    Edema [x] [] [] []    Activity Tolerance [] [x] [] []     [] [] [] []      COGNITION/  PERCEPTION: Intact Impaired (see comments) Comments:   Orientation [x] []    Vision [x] []    Hearing [x] []    Judgment/ Insight [x] []    Attention [x] []    Memory [x] []    Command Following [x] []    Emotional Regulation [x] []     [] []      ACTIVITIES OF DAILY LIVING: I Mod I S SBA CGA Min Mod Max Total NT Comments   BASIC ADLs:              Bathing/ Showering [] [] [] [] [] [] [] [] [] [x]    Toileting [] [] [] [] [] [] [] [] [] [x]    Dressing [] [] [x] [] [] [] [] [] [] [] Supervision for monitoring of O2 sats only; otherwise independent. Feeding [] [] [] [] [] [] [] [] [] [x]    Grooming [] [] [x] [] [] [] [] [] [] [] For monitoring of O2 sats. Personal Device Care [] [] [] [] [] [] [] [] [] [x]    Functional Mobility [] [] [x] [] [] [] [] [] [] [] Supervision for monitoring of O2 sats; otherwise independent. I=Independent, Mod I=Modified Independent, S=Supervision, SBA=Standby Assistance, CGA=Contact Guard Assistance,   Min=Minimal Assistance, Mod=Moderate Assistance, Max=Maximal Assistance, Total=Total Assistance, NT=Not Tested    MOBILITY: I Mod I S SBA CGA Min Mod Max Total  NT x2 Comments:   Supine to sit [] [x] [] [] [] [] [] [] [] [] [] With head of bed elevated. Sit to supine [] [] [] [] [] [] [] [] [] [x] []    Sit to stand [] [] [x] [] [] [] [] [] [] [] []    Bed to chair [] [] [x] [] [] [] [] [] [] [] [] Supervision for monitoring of O2 sats only. I=Independent, Mod I=Modified Independent, S=Supervision, SBA=Standby Assistance, CGA=Contact Guard Assistance,   Min=Minimal Assistance, Mod=Moderate Assistance, Max=Maximal Assistance, Total=Total Assistance, NT=Not Paund[place] 6   Daily Activity Inpatient Short Form        How much help from another person does the patient currently need. .. Total A Lot A Little None   1. Putting on and taking off regular lower body clothing? [] 1   [] 2   [] 3   [x] 4   2. Bathing (including washing, rinsing, drying)?    [] 1   [] 2   [] 3   [x] 4   3.  Toileting, which includes using toilet, bedpan or urinal?   [] 1   [] 2   [] 3   [x] 4   4. Putting on and taking off regular upper body clothing? [] 1   [] 2   [] 3   [x] 4   5. Taking care of personal grooming such as brushing teeth? [] 1   [] 2   [] 3   [x] 4   6. Eating meals? [] 1   [] 2   [] 3   [x] 4   © 2007, Trustees of [address] [place] [address] [address] [address], under license to [employer]. All rights reserved     Score:  Initial: 24, completed, 5/4/2021 Most Recent: X (Date: -- )   Interpretation of Tool:  Represents activities that are increasingly more difficult (i.e. Bed mobility, Transfers, Gait). PLAN:   FREQUENCY/DURATION: OT Plan of Care: (1x visit, treat, d/c) for duration of hospital stay or until stated goals are met, whichever comes first.    PROBLEM LIST:   (Skilled intervention is medically necessary to address:)  1. Decreased Activity Tolerance. Will defer to PT to address. INTERVENTIONS PLANNED:   (Benefits and precautions of occupational therapy have been discussed with the patient.)  1. Self Care Training  2. Education     TREATMENT:     EVALUATION: Low Complexity : (Untimed Charge)    TREATMENT:   Self Care (8 Minutes): Self care including Lower Body Dressing and Grooming to increase independence and decrease level of assistance required. TREATMENT GRID:  N/A    AFTER TREATMENT POSITION/PRECAUTIONS:  Chair, Needs within reach, RN notified and table tray anterior to pt.     INTERDISCIPLINARY COLLABORATION:  RN/PCT, PT/PTA and OT/LEBLANC    TOTAL TREATMENT DURATION:  OT Patient Time In/Time Out  Time In: 1426  Time Out: 1440    Remedios Healy OTR/SANTIAGO

## 2021-05-04 NOTE — ROUTINE PROCESS
Pt arrived to room and assessment completed at this time. Oriented pt to room/unit. VS done and are stable. Pt c/o no pain and is on 2 L of O2. Pt has no needs or complaints currently. Will monitor.

## 2021-05-04 NOTE — PROGRESS NOTES
05/03/21 1924   Dual Skin Pressure Injury Assessment   Dual Skin Pressure Injury Assessment WDL   Second Care Provider (Based on [address] [place] [address]) Vinicius Champagne, RN   Skin Integumentary   Skin Integumentary (WDL) WDL    Pressure  Injury Documentation No Pressure Injury Noted-Pressure Ulcer Prevention Initiated

## 2021-05-05 PROCEDURE — 74011250637 HC RX REV CODE- 250/637: Performed by: INTERNAL MEDICINE

## 2021-05-05 PROCEDURE — 74011250636 HC RX REV CODE- 250/636: Performed by: INTERNAL MEDICINE

## 2021-05-05 PROCEDURE — 74011250637 HC RX REV CODE- 250/637: Performed by: HOSPITALIST

## 2021-05-05 PROCEDURE — 65270000029 HC RM PRIVATE

## 2021-05-05 RX ADMIN — GUAIFENESIN AND DEXTROMETHORPHAN 5 ML: 100; 10 SYRUP ORAL at 08:17

## 2021-05-05 RX ADMIN — VITAMIN D, TAB 1000IU (100/BT) 2000 UNITS: 25 TAB at 08:16

## 2021-05-05 RX ADMIN — Medication 10 ML: at 21:12

## 2021-05-05 RX ADMIN — GUAIFENESIN 600 MG: 600 TABLET ORAL at 21:12

## 2021-05-05 RX ADMIN — PROMETHAZINE HYDROCHLORIDE 12.5 MG: 25 TABLET ORAL at 17:04

## 2021-05-05 RX ADMIN — FAMOTIDINE 20 MG: 20 TABLET, FILM COATED ORAL at 18:00

## 2021-05-05 RX ADMIN — Medication 10 ML: at 14:00

## 2021-05-05 RX ADMIN — GUAIFENESIN 600 MG: 600 TABLET ORAL at 08:16

## 2021-05-05 RX ADMIN — ENOXAPARIN SODIUM 40 MG: 40 INJECTION SUBCUTANEOUS at 11:00

## 2021-05-05 RX ADMIN — Medication 1 AMPULE: at 08:16

## 2021-05-05 RX ADMIN — Medication 1 AMPULE: at 21:12

## 2021-05-05 RX ADMIN — LISINOPRIL 40 MG: 20 TABLET ORAL at 08:16

## 2021-05-05 RX ADMIN — DEXAMETHASONE 6 MG: 4 TABLET ORAL at 08:16

## 2021-05-05 RX ADMIN — GUAIFENESIN AND DEXTROMETHORPHAN 5 ML: 100; 10 SYRUP ORAL at 18:00

## 2021-05-05 RX ADMIN — FAMOTIDINE 20 MG: 20 TABLET, FILM COATED ORAL at 08:16

## 2021-05-05 RX ADMIN — Medication 10 ML: at 06:01

## 2021-05-05 RX ADMIN — ASPIRIN 81 MG: 81 TABLET, CHEWABLE ORAL at 08:16

## 2021-05-05 RX ADMIN — GUAIFENESIN AND DEXTROMETHORPHAN 5 ML: 100; 10 SYRUP ORAL at 21:12

## 2021-05-05 NOTE — PROGRESS NOTES
Hospitalist Progress Note     Admit Date:  5/3/2021  5:31 PM   Name:  Ben Manzanares   Age:  53 y.o.  :  1970   MRN:  [id number]   PCP:  Stephanie Glass MD  Presenting Complaint: No chief complaint on file. Initial Admission Diagnosis: Pneumonia due to COVID-19 virus [U07.1, J12.82]  Acute respiratory failure due to COVID-19 (HCC) [U07.1, J96.00]  Acute respiratory failure with hypoxia ([address] [J96.01]     Assessment and Plan:     Hospital Problems as of 2021 Never Reviewed          Codes Class Noted - Resolved POA    Acute respiratory failure with hypoxia [facility]) ICD-10-CM: J96.01  ICD-9-CM: 518.81  2021 - Present Yes        * (Principal) Acute respiratory failure due to COVID-19 [facility]) ICD-10-CM: U07.1, J96.00  ICD-9-CM: 518.81, 079.89  2021 - Present Yes        Pneumonia due to COVID-19 virus ICD-10-CM: U07.1, J12.82  ICD-9-CM: 480.8, 079.89  2021 - Present Yes        Essential hypertension (Chronic) ICD-10-CM: I10  ICD-9-CM: 401.9  2021 - Present Yes              Plan:  COVID  21 - No changes to plan   -wean o2 as able. Main barrier to discharge  -decadron EOT   -not remdesivir candidate due to symptoms >6d  -lovenox dvt ppx    Nausea  21 - No changes to plan   -likely from above  -zofran    HTN  21 - No changes to plan   -cont home meds    DC planning:    -home when improved    Other listed chronic conditions stable, continue current management. Diet:  DIET CARDIAC      Hospital Course:   67-year-old male with history of HTN, nephrolithiasis, fatty liver who presented with 1 week history of subjective fever and generalized weakness with muscle aches. ER work-up was remarkable for right upper lobe infiltrate, SARS-CoV-2 positive on 2021. [address] work was unremarkable.  Patient's O2 sat ranging from 91 to 95% on room air at rest but dropped to 88 to 90% with minimal exertion. He was started on Decadron.   remdesivir not appropriate given >6d from symptom onset. Plasma not recommended anymore. 24hr Events/Subjective:   Feels better. Still on 3L. Still with some cough, responsive to robitussin. Slept better last night. No CP, fevers    No other complaints  Objective:     Patient Vitals for the past 24 hrs:   Temp Pulse Resp BP SpO2   05/05/21 0720 97.5 °F (36.4 °C) 67 16 131/83 96 %   05/05/21 0346 97.5 °F (36.4 °C) 60 18 114/66 93 %   05/04/21 2353 97.7 °F (36.5 °C) 66 18 122/73 93 %   05/04/21 2026     96 %   05/04/21 1907 98.2 °F (36.8 °C) 76 18 126/69 93 %   05/04/21 1800 98 °F (36.7 °C) 73 18 126/71 95 %   05/04/21 1300     97 %   05/04/21 1206 98 °F (36.7 °C) 74 16 133/71      Oxygen Therapy  O2 Sat (%): 96 % (05/05/21 0720)  Pulse via Oximetry: 80 beats per minute (05/04/21 2026)  O2 Device: Nasal cannula (05/04/21 2026)  Skin Assessment: Clean, dry, & intact (05/04/21 1910)  O2 Flow Rate (L/min): 2 l/min (05/04/21 2026)    Estimated body mass index is 38.01 kg/m² as calculated from the following:    Height as of this encounter: 5' 8\" (1.727 m). Weight as of this encounter: 113.4 kg (250 lb). No intake or output data in the 24 hours ending 05/05/21 98    *Note that automatically entered I/Os may not be accurate; dependent on patient compliance with collection and accurate  by techs. General:    Well nourished. No overt distress  CV:   RRR. No edema. No JVD  Lungs:   Even, Unlabored  Abdomen:   nondistended. Extremities: Warm and dry. No cyanosis   Skin:     No rashes. Normal coloration  Neuro:  No gross focal deficits. I have reviewed all labs, meds, and other studies shown below:  Last 24hr Labs:  No results found for this or any previous visit (from the past 24 hour(s)).     All Micro Results     None          SARS-CoV-2 Lab Results  \"Novel Coronavirus\" Test: No results found for: COV2NT   \"Emergent Disease\" Test: No results found for: EDPR  \"SARS-COV-2\" Test: No results found for: XGCOVT  Rapid Test: Lab Results   Component Value Date/Time    COVR Detected (AA) 05/02/2021 05:22 PM            Current Meds:  Current Facility-Administered Medications   Medication Dose Route Frequency    alcohol 62% (NOZIN) nasal  1 Ampule  1 Ampule Topical Q12H    dexAMETHasone (DECADRON) tablet 6 mg  6 mg Oral DAILY    enoxaparin (LOVENOX) injection 40 mg  40 mg SubCUTAneous Q24H    sodium chloride (NS) flush 5-40 mL  5-40 mL IntraVENous Q8H    sodium chloride (NS) flush 5-40 mL  5-40 mL IntraVENous PRN    acetaminophen (TYLENOL) tablet 650 mg  650 mg Oral Q6H PRN    Or    acetaminophen (TYLENOL) suppository 650 mg  650 mg Rectal Q6H PRN    cholecalciferol (VITAMIN D3) (1000 Units /25 mcg) tablet 2,000 Units  2,000 Units Oral DAILY    famotidine (PEPCID) tablet 20 mg  20 mg Oral BID    guaiFENesin-dextromethorphan (ROBITUSSIN DM) 100-10 mg/5 mL syrup 5 mL  5 mL Oral BID and QHS    polyethylene glycol (MIRALAX) packet 17 g  17 g Oral DAILY PRN    promethazine (PHENERGAN) tablet 12.5 mg  12.5 mg Oral Q6H PRN    Or    ondansetron (ZOFRAN) injection 4 mg  4 mg IntraVENous Q6H PRN    aspirin chewable tablet 81 mg  81 mg Oral DAILY    guaiFENesin ER (MUCINEX) tablet 600 mg  600 mg Oral Q12H    lisinopriL (PRINIVIL, ZESTRIL) tablet 40 mg  40 mg Oral DAILY    sodium chloride (NS) flush 5-40 mL  5-40 mL IntraVENous Q8H    sodium chloride (NS) flush 5-40 mL  5-40 mL IntraVENous PRN    temazepam (RESTORIL) capsule 15 mg  15 mg Oral QHS PRN       Other Studies:  No results found for this visit on 05/03/21. No results found.     Signed:  Jason Burris MD

## 2021-05-05 NOTE — PROGRESS NOTES
PT Note:  Attempted PT. Lunch arriving and patient asks PT to return another time so he can eat lunch. States that he has been up ad maye this morning. Will attempt another time/day as schedule permits.   A Melanie Choi, PT, DPT

## 2021-05-05 NOTE — PROGRESS NOTES
Emergency Contact: Severiano Bora [phone]    Chart screened by  for discharge planning. No needs identified at this time. Will continue to follow for discharge planning needs  Please consult  if any new issues arise.     Care Management Interventions  PCP Verified by CM: Jennifer Torres MD)  Mode of Transport at Discharge: Self  Transition of Care Consult (CM Consult): Discharge Planning  Discharge Durable Medical Equipment: No  Physical Therapy Consult: Yes  Occupational Therapy Consult: Yes  Speech Therapy Consult: No  Current Support Network: Own Home, Lives with Jose Rivas [phone])  Confirm Follow Up Transport: Family  The Plan for Transition of Care is Related to the Following Treatment Goals : no needs  The Patient and/or Patient Representative was Provided with a Choice of Provider and Agrees with the Discharge Plan?: Yes  Name of the Patient Representative Who was Provided with a Choice of Provider and Agrees with the Discharge Plan: patient  Freedom of Choice List was Provided with Basic Dialogue that Supports the Patient's Individualized Plan of Care/Goals, Treatment Preferences and Shares the Quality Data Associated with the Providers?: Yes  [place] Resource Information Provided?: No  Discharge Location  Discharge Placement: Home

## 2021-05-06 PROCEDURE — 74011250636 HC RX REV CODE- 250/636: Performed by: INTERNAL MEDICINE

## 2021-05-06 PROCEDURE — 74011250637 HC RX REV CODE- 250/637: Performed by: HOSPITALIST

## 2021-05-06 PROCEDURE — 97530 THERAPEUTIC ACTIVITIES: CPT

## 2021-05-06 PROCEDURE — 65270000029 HC RM PRIVATE

## 2021-05-06 PROCEDURE — 74011250637 HC RX REV CODE- 250/637: Performed by: INTERNAL MEDICINE

## 2021-05-06 RX ADMIN — LISINOPRIL 40 MG: 20 TABLET ORAL at 08:28

## 2021-05-06 RX ADMIN — FAMOTIDINE 20 MG: 20 TABLET, FILM COATED ORAL at 18:00

## 2021-05-06 RX ADMIN — Medication 10 ML: at 21:43

## 2021-05-06 RX ADMIN — Medication 10 ML: at 05:54

## 2021-05-06 RX ADMIN — Medication 10 ML: at 13:52

## 2021-05-06 RX ADMIN — GUAIFENESIN AND DEXTROMETHORPHAN 5 ML: 100; 10 SYRUP ORAL at 08:28

## 2021-05-06 RX ADMIN — VITAMIN D, TAB 1000IU (100/BT) 2000 UNITS: 25 TAB at 08:28

## 2021-05-06 RX ADMIN — GUAIFENESIN AND DEXTROMETHORPHAN 5 ML: 100; 10 SYRUP ORAL at 18:00

## 2021-05-06 RX ADMIN — GUAIFENESIN AND DEXTROMETHORPHAN 5 ML: 100; 10 SYRUP ORAL at 21:42

## 2021-05-06 RX ADMIN — GUAIFENESIN 600 MG: 600 TABLET ORAL at 08:29

## 2021-05-06 RX ADMIN — FAMOTIDINE 20 MG: 20 TABLET, FILM COATED ORAL at 08:29

## 2021-05-06 RX ADMIN — DEXAMETHASONE 6 MG: 4 TABLET ORAL at 08:28

## 2021-05-06 RX ADMIN — Medication 1 AMPULE: at 08:28

## 2021-05-06 RX ADMIN — Medication 1 AMPULE: at 21:42

## 2021-05-06 RX ADMIN — ASPIRIN 81 MG: 81 TABLET, CHEWABLE ORAL at 08:28

## 2021-05-06 RX ADMIN — ENOXAPARIN SODIUM 40 MG: 40 INJECTION SUBCUTANEOUS at 11:00

## 2021-05-06 NOTE — PROGRESS NOTES
Problem: Airway Clearance - Ineffective  Goal: Achieve or maintain patent airway  Outcome: Progressing Towards Goal     Problem: Gas Exchange - Impaired  Goal: Absence of hypoxia  Outcome: Progressing Towards Goal  Goal: Promote optimal lung function  Outcome: Progressing Towards Goal     Problem: Breathing Pattern - Ineffective  Goal: Ability to achieve and maintain a regular respiratory rate  Outcome: Progressing Towards Goal     Problem: Nutrition Deficits  Goal: Optimize nutrtional status  Outcome: Progressing Towards Goal     Problem: Risk for Fluid Volume Deficit  Goal: Maintain normal heart rhythm  Outcome: Progressing Towards Goal     Problem: Loneliness or Risk for Loneliness  Goal: Demonstrate positive use of time alone when socialization is not possible  Outcome: Progressing Towards Goal     Problem: Fatigue  Goal: Verbalize increase energy and improved vitality  Outcome: Progressing Towards Goal     Problem: Falls - Risk of  Goal: *Absence of Falls  Description: Document Uziel Fall Risk and appropriate interventions in the flowsheet.   Outcome: Progressing Towards Goal  Note: Fall Risk Interventions:            Medication Interventions: Teach patient to arise slowly

## 2021-05-06 NOTE — PROGRESS NOTES
Hospitalist Progress Note     Admit Date:  5/3/2021  5:31 PM   Name:  Cassandra Strickland   Age:  53 y.o.  :  1970   MRN:  [id number]   PCP:  Lyssa Sullivan MD  Presenting Complaint: No chief complaint on file. Initial Admission Diagnosis: Pneumonia due to COVID-19 virus [U07.1, J12.82]  Acute respiratory failure due to COVID-19 (HCC) [U07.1, J96.00]  Acute respiratory failure with hypoxia ([address] [J96.01]     Assessment and Plan:     Hospital Problems as of 2021 Never Reviewed          Codes Class Noted - Resolved POA    Acute respiratory failure with hypoxia [facility]) ICD-10-CM: J96.01  ICD-9-CM: 518.81  2021 - Present Yes        * (Principal) Acute respiratory failure due to COVID-19 [facility]) ICD-10-CM: U07.1, J96.00  ICD-9-CM: 518.81, 079.89  2021 - Present Yes        Pneumonia due to COVID-19 virus ICD-10-CM: U07.1, J12.82  ICD-9-CM: 480.8, 079.89  2021 - Present Yes        Essential hypertension (Chronic) ICD-10-CM: I10  ICD-9-CM: 401.9  2021 - Present Yes              Plan:  COVID  21 - No changes to plan   -wean o2 as able. Possible discharge tomorrow with or without o2  -decadron EOT   -not remdesivir candidate due to symptoms >6d  -lovenox dvt ppx    Nausea  21 - No changes to plan   -likely from above  -zofran    HTN  21 - No changes to plan   -cont home meds    DC planning:    -home when improved    Other listed chronic conditions stable, continue current management. Diet:  DIET CARDIAC      Hospital Course:   36-year-old male with history of HTN, nephrolithiasis, fatty liver who presented with 1 week history of subjective fever and generalized weakness with muscle aches. ER work-up was remarkable for right upper lobe infiltrate, SARS-CoV-2 positive on 2021. [address] [place] [address] [employer] work was unremarkable.  Patient's O2 sat ranging from 91 to 95% on room air at rest but dropped to 88 to 90% with minimal exertion. He was started on Decadron.   remdesivir not appropriate given >6d from symptom onset. Plasma not recommended anymore. 24hr Events/Subjective:   On 2L NC today, better vs 3L yesterday. Still tired but feeling better overall. Still with coughing. No fevers. No other complaints  Objective:     Patient Vitals for the past 24 hrs:   Temp Pulse Resp BP SpO2   05/06/21 0725 97.5 °F (36.4 °C) (!) 56 16 121/70 97 %   05/06/21 0344 97.5 °F (36.4 °C) 62 18 111/76 93 %   05/05/21 2332 97.5 °F (36.4 °C) 70 18 110/69 94 %   05/05/21 1919 97.9 °F (36.6 °C) 70 18 112/70 95 %   05/05/21 1538 97.9 °F (36.6 °C) 64 16 (!) 107/92 96 %   05/05/21 1057 97.7 °F (36.5 °C) 63 16 120/81 93 %     Oxygen Therapy  O2 Sat (%): 97 % (05/06/21 0725)  Pulse via Oximetry: 80 beats per minute (05/04/21 2026)  O2 Device: Nasal cannula (05/04/21 2026)  Skin Assessment: Clean, dry, & intact (05/04/21 1910)  O2 Flow Rate (L/min): 3 l/min (05/06/21 0725)    Estimated body mass index is 38.01 kg/m² as calculated from the following:    Height as of this encounter: 5' 8\" (1.727 m). Weight as of this encounter: 113.4 kg (250 lb). No intake or output data in the 24 hours ending 05/06/21 1018    *Note that automatically entered I/Os may not be accurate; dependent on patient compliance with collection and accurate  by techs. General:    Well nourished. No overt distress  CV:   RRR. No edema. No JVD  Lungs:   Even, Unlabored  Abdomen:   nondistended. Extremities: Warm and dry. No cyanosis   Skin:     No rashes. Normal coloration  Neuro:  No gross focal deficits. I have reviewed all labs, meds, and other studies shown below:  Last 24hr Labs:  No results found for this or any previous visit (from the past 24 hour(s)).     All Micro Results     None          SARS-CoV-2 Lab Results  \"Novel Coronavirus\" Test: No results found for: COV2NT   \"Emergent Disease\" Test: No results found for: EDPR  \"SARS-COV-2\" Test: No results found for: XGCOVT  Rapid Test:   Lab Results Component Value Date/Time    COVR Detected (AA) 05/02/2021 05:22 PM            Current Meds:  Current Facility-Administered Medications   Medication Dose Route Frequency    alcohol 62% (NOZIN) nasal  1 Ampule  1 Ampule Topical Q12H    dexAMETHasone (DECADRON) tablet 6 mg  6 mg Oral DAILY    enoxaparin (LOVENOX) injection 40 mg  40 mg SubCUTAneous Q24H    sodium chloride (NS) flush 5-40 mL  5-40 mL IntraVENous Q8H    sodium chloride (NS) flush 5-40 mL  5-40 mL IntraVENous PRN    acetaminophen (TYLENOL) tablet 650 mg  650 mg Oral Q6H PRN    Or    acetaminophen (TYLENOL) suppository 650 mg  650 mg Rectal Q6H PRN    cholecalciferol (VITAMIN D3) (1000 Units /25 mcg) tablet 2,000 Units  2,000 Units Oral DAILY    famotidine (PEPCID) tablet 20 mg  20 mg Oral BID    guaiFENesin-dextromethorphan (ROBITUSSIN DM) 100-10 mg/5 mL syrup 5 mL  5 mL Oral BID and QHS    polyethylene glycol (MIRALAX) packet 17 g  17 g Oral DAILY PRN    promethazine (PHENERGAN) tablet 12.5 mg  12.5 mg Oral Q6H PRN    Or    ondansetron (ZOFRAN) injection 4 mg  4 mg IntraVENous Q6H PRN    aspirin chewable tablet 81 mg  81 mg Oral DAILY    guaiFENesin ER (MUCINEX) tablet 600 mg  600 mg Oral Q12H    lisinopriL (PRINIVIL, ZESTRIL) tablet 40 mg  40 mg Oral DAILY    sodium chloride (NS) flush 5-40 mL  5-40 mL IntraVENous Q8H    sodium chloride (NS) flush 5-40 mL  5-40 mL IntraVENous PRN    temazepam (RESTORIL) capsule 15 mg  15 mg Oral QHS PRN       Other Studies:  No results found for this visit on 05/03/21. No results found.     Signed:  Kenton Hicks MD

## 2021-05-06 NOTE — PROGRESS NOTES
ACUTE PHYSICAL THERAPY GOALS:  (Developed with and agreed upon by patient and/or caregiver. )  LTG:  All goals met 5/6/2021   (1.)Mr. Alan Mckinley will ambulate with  INDEPENDENCE for 400+ feet with no device within 7 day(s). (2.)Mr. Alan Mckinley will perform standing static and dynamic balance activities x 8 minutes with SUPERVISION to improve safety within 7 day(s). PHYSICAL THERAPY: Daily Note and Discharge Treatment Day # 2    Gulshan Sauer is a 53 y.o. male   PRIMARY DIAGNOSIS: Acute respiratory failure due to COVID-19 [facility])  Pneumonia due to COVID-19 virus [U07.1, J12.82]  Acute respiratory failure due to COVID-19 (HCC) [U07.1, J96.00]  Acute respiratory failure with hypoxia ([address] [J96.01]         ASSESSMENT:     REHAB RECOMMENDATIONS: CURRENT LEVEL OF FUNCTION:  (Most Recently Demonstrated)   Recommendation to date pending progress:  Setting:   No further skilled therapy   Equipment:    None Bed Mobility:   Modified Independent  Sit to Stand:   Independent  Transfers:   Independent  Gait/Mobility:   Independent     ASSESSMENT:  Mr. Alan Mckinley increased ambulation distance and activity tolerance. SpO2 remained >90% throughout mobility on nasal cannula. Patient up ad maye in room throughout day. All goals met, will discontinue skilled PT.     SUBJECTIVE:   Mr. Alan Mckinley states, \"I'm tired today. \"    SOCIAL HISTORY/ LIVING ENVIRONMENT: independent.   Home Environment: Private residence  # Steps to Enter: 2  One/Two Story Residence: Two story  Living Alone: No  Support Systems: Spouse/Significant Other/Partner  OBJECTIVE:     PAIN: VITAL SIGNS: LINES/DRAINS:   Pre Treatment: Pain Screen  Pain Scale 1: Numeric (0 - 10)  Pain Intensity 1: 0  Post Treatment: 0   SpO2 remained >90%    O2 Device: Nasal cannula     MOBILITY: I Mod I S SBA CGA Min Mod Max Total  NT x2 Comments:   Bed Mobility    Rolling [] [x] [] [] [] [] [] [] [] [] []    Supine to Sit [] [x] [] [] [] [] [] [] [] [] []    Scooting [] [x] [] [] [] [] [] [] [] [] []    Sit to Supine [] [x] [] [] [] [] [] [] [] [] []    Transfers    Sit to Stand [x] [] [] [] [] [] [] [] [] [] []    Bed to Chair [x] [] [] [] [] [] [] [] [] [] []    Stand to Sit [x] [] [] [] [] [] [] [] [] [] []    I=Independent, Mod I=Modified Independent, S=Supervision, SBA=Standby Assistance, CGA=Contact Guard Assistance,   Min=Minimal Assistance, Mod=Moderate Assistance, Max=Maximal Assistance, Total=Total Assistance, NT=Not Tested    BALANCE: Good Fair+ Fair Fair- Poor NT Comments   Sitting Static [x] [] [] [] [] []    Sitting Dynamic [x] [] [] [] [] []              Standing Static [x] [] [] [] [] []    Standing Dynamic [x] [] [] [] [] []      GAIT: I Mod I S SBA CGA Min Mod Max Total  NT x2 Comments:   Level of Assistance [x] [] [] [] [] [] [] [] [] [] []    Distance 400'    DME None    Gait Quality WNL    Weightbearing  Status N/A     I=Independent, Mod I=Modified Independent, S=Supervision, SBA=Standby Assistance, CGA=Contact Guard Assistance,   Min=Minimal Assistance, Mod=Moderate Assistance, Max=Maximal Assistance, Total=Total Assistance, NT=Not Tested    PLAN:   FREQUENCY/DURATION: PT Plan of Care: 3 times/week for duration of hospital stay or until stated goals are met, whichever comes first.  TREATMENT:     TREATMENT:   ($$ Therapeutic Activity: 8-22 mins    )  Therapeutic Activity (15 Minutes): Therapeutic activity included Ambulation on level ground and Standing balance to improve functional Mobility, Strength and Activity tolerance.     TREATMENT GRID:       AFTER TREATMENT POSITION/PRECAUTIONS:  Chair, Needs within reach and RN notified    INTERDISCIPLINARY COLLABORATION:  RN/PCT and PT/PTA    TOTAL TREATMENT DURATION:  PT Patient Time In/Time Out  Time In: 1044  Time Out: Gil Ball PT, DPT

## 2021-05-07 VITALS
RESPIRATION RATE: 18 BRPM | HEIGHT: 68 IN | HEART RATE: 61 BPM | DIASTOLIC BLOOD PRESSURE: 98 MMHG | SYSTOLIC BLOOD PRESSURE: 120 MMHG | BODY MASS INDEX: 37.89 KG/M2 | OXYGEN SATURATION: 96 % | TEMPERATURE: 97.8 F | WEIGHT: 250 LBS

## 2021-05-07 PROCEDURE — 74011250636 HC RX REV CODE- 250/636: Performed by: INTERNAL MEDICINE

## 2021-05-07 PROCEDURE — 74011250637 HC RX REV CODE- 250/637: Performed by: HOSPITALIST

## 2021-05-07 PROCEDURE — 74011250637 HC RX REV CODE- 250/637: Performed by: INTERNAL MEDICINE

## 2021-05-07 RX ORDER — ALBUTEROL SULFATE 90 UG/1
2 AEROSOL, METERED RESPIRATORY (INHALATION)
Qty: 1 INHALER | Refills: 1 | Status: SHIPPED | OUTPATIENT
Start: 2021-05-07

## 2021-05-07 RX ORDER — DEXAMETHASONE 6 MG/1
6 TABLET ORAL DAILY
Qty: 4 TAB | Refills: 0 | Status: SHIPPED | OUTPATIENT
Start: 2021-05-07 | End: 2021-05-11

## 2021-05-07 RX ORDER — GUAIFENESIN/DEXTROMETHORPHAN 100-10MG/5
10 SYRUP ORAL
Qty: 1 BOTTLE | Refills: 0 | Status: SHIPPED | OUTPATIENT
Start: 2021-05-07 | End: 2021-05-14

## 2021-05-07 RX ORDER — GUAIFENESIN 600 MG/1
600 TABLET, EXTENDED RELEASE ORAL EVERY 12 HOURS
Qty: 14 TAB | Refills: 0 | Status: SHIPPED | OUTPATIENT
Start: 2021-05-07 | End: 2021-05-14

## 2021-05-07 RX ADMIN — GUAIFENESIN AND DEXTROMETHORPHAN 5 ML: 100; 10 SYRUP ORAL at 09:23

## 2021-05-07 RX ADMIN — Medication 10 ML: at 05:34

## 2021-05-07 RX ADMIN — GUAIFENESIN 600 MG: 600 TABLET ORAL at 00:40

## 2021-05-07 RX ADMIN — LISINOPRIL 40 MG: 20 TABLET ORAL at 09:22

## 2021-05-07 RX ADMIN — ASPIRIN 81 MG: 81 TABLET, CHEWABLE ORAL at 09:22

## 2021-05-07 RX ADMIN — GUAIFENESIN 600 MG: 600 TABLET ORAL at 09:23

## 2021-05-07 RX ADMIN — Medication 1 AMPULE: at 09:23

## 2021-05-07 RX ADMIN — ENOXAPARIN SODIUM 40 MG: 40 INJECTION SUBCUTANEOUS at 10:27

## 2021-05-07 RX ADMIN — FAMOTIDINE 20 MG: 20 TABLET, FILM COATED ORAL at 09:22

## 2021-05-07 RX ADMIN — VITAMIN D, TAB 1000IU (100/BT) 2000 UNITS: 25 TAB at 09:23

## 2021-05-07 RX ADMIN — DEXAMETHASONE 6 MG: 4 TABLET ORAL at 09:24

## 2021-05-07 NOTE — PROGRESS NOTES
Pt is for discharge home today with no needs/supportive care orders received for CM at this time.   Pt will have close follow up with PCP    Milestone met    Care Management Interventions  PCP Verified by CM: (Rafael Dalton MD)  Mode of Transport at Discharge: Self  Transition of Care Consult (CM Consult): Discharge Planning  Discharge Durable Medical Equipment: No  Physical Therapy Consult: Yes  Occupational Therapy Consult: Yes  Speech Therapy Consult: No  Current Support Network: Own Home, Lives with Spouse(Fadia Argueta [phone])  Confirm Follow Up Transport: Family  The Plan for Transition of Care is Related to the Following Treatment Goals : no needs  The Patient and/or Patient Representative was Provided with a Choice of Provider and Agrees with the Discharge Plan?: Yes  Name of the Patient Representative Who was Provided with a Choice of Provider and Agrees with the Discharge Plan: patient  Freedom of Choice List was Provided with Basic Dialogue that Supports the Patient's Individualized Plan of Care/Goals, Treatment Preferences and Shares the Quality Data Associated with the Providers?: Yes  [place] Resource Information Provided?: No  Discharge Location  Discharge Placement: Home

## 2021-05-07 NOTE — PROGRESS NOTES
Problem: Falls - Risk of  Goal: *Absence of Falls  Description: Document Breanna Orellana Fall Risk and appropriate interventions in the flowsheet.   Outcome: Progressing Towards Goal  Note: Fall Risk Interventions:            Medication Interventions: Teach patient to arise slowly                   Problem: Patient Education: Go to Patient Education Activity  Goal: Patient/Family Education  Outcome: Progressing Towards Goal

## 2021-05-07 NOTE — PROGRESS NOTES
After visit summary reviewed with patient and spouse. Verbalized understanding. Peripheral IV removed with catheter tip intact. Patient made aware of whom to notify in case of emergency. Opportunity given to review information and ask questions.

## 2021-05-07 NOTE — PROGRESS NOTES
Problem: Airway Clearance - Ineffective  Goal: Achieve or maintain patent airway  Outcome: Progressing Towards Goal     Problem: Gas Exchange - Impaired  Goal: Absence of hypoxia  Outcome: Progressing Towards Goal  Goal: Promote optimal lung function  Outcome: Progressing Towards Goal     Problem: Breathing Pattern - Ineffective  Goal: Ability to achieve and maintain a regular respiratory rate  Outcome: Progressing Towards Goal     Problem: Body Temperature -  Risk of, Imbalanced  Goal: Ability to maintain a body temperature within defined limits  Outcome: Progressing Towards Goal  Goal: Will regain or maintain usual level of consciousness  Outcome: Progressing Towards Goal  Goal: Complications related to the disease process, condition or treatment will be avoided or minimized  Outcome: Progressing Towards Goal     Problem: Isolation Precautions - Risk of Spread of Infection  Goal: Prevent transmission of infectious organism to others  Outcome: Progressing Towards Goal     Problem: Nutrition Deficits  Goal: Optimize nutrtional status  Outcome: Progressing Towards Goal     Problem: Risk for Fluid Volume Deficit  Goal: Maintain normal heart rhythm  Outcome: Progressing Towards Goal  Goal: Maintain absence of muscle cramping  Outcome: Progressing Towards Goal     Problem: Loneliness or Risk for Loneliness  Goal: Demonstrate positive use of time alone when socialization is not possible  Outcome: Progressing Towards Goal     Problem: Falls - Risk of  Goal: *Absence of Falls  Description: Document Uziel Fall Risk and appropriate interventions in the flowsheet.   Outcome: Progressing Towards Goal  Note: Fall Risk Interventions:            Medication Interventions: Teach patient to arise slowly                   Problem: Patient Education: Go to Patient Education Activity  Goal: Patient/Family Education  Outcome: Progressing Towards Goal

## 2021-05-07 NOTE — PROGRESS NOTES
Oxygen Qualifier       Room air: SpO2 with O2 and liter flow   Resting SpO2  96%  96% on RA   Ambulating SpO2  93%  93% on RA         Completed by:    Richard Conde, RT

## 2021-05-07 NOTE — DISCHARGE SUMMARY
Hospitalist Discharge Summary     Admit Date:  5/3/2021  5:31 PM   DC note date: 2021  Name:  Sadiq Villa   Age:  53 y.o.  :  1970   MRN:  [id number]   PCP:  hCelle Linares MD  Treatment Team: Attending Provider: Candance Rucks, MD; Care Manager: Chun Salinas RN  Presenting Complaint: No chief complaint on file. Initial Admission Diagnosis: Pneumonia due to COVID-19 virus [U07.1, J12.82]  Acute respiratory failure due to COVID-19 (HCC) [U07.1, J96.00]  Acute respiratory failure with hypoxia ([address] [J96.01]     Problem List for this Hospitalization:  Hospital Problems as of 2021 Never Reviewed          Codes Class Noted - Resolved POA    Acute respiratory failure with hypoxia [facility]) ICD-10-CM: J96.01  ICD-9-CM: 518.81  2021 - Present Yes        * (Principal) Acute respiratory failure due to COVID-19 [facility]) ICD-10-CM: U07.1, J96.00  ICD-9-CM: 518.81, 079.89  2021 - Present Yes        Pneumonia due to COVID-19 virus ICD-10-CM: U07.1, J12.82  ICD-9-CM: 480.8, 079.89  2021 - Present Yes        Essential hypertension (Chronic) ICD-10-CM: I10  ICD-9-CM: 401.9  2021 - Present Yes                Admission HPI from 5/3/2021:    \"Patient is a 42-year-old male with history of HTN, nephrolithiasis, fatty liver who presented with 1 week history of subjective fever and generalized weakness with muscle aches. Patient reports that symptoms began about a week ago with subjective fevers, feeling hot, intermittent chills that progressed to generalized weakness, fatigue, muscle aches. Over the last 4 to 5 days patient also noticed dry cough and shortness of breath which is mostly with exertion but over the last 24 to 48 hours it is also present at rest.  He denies having any nausea vomiting or diarrhea. Denies chest pain, palpitations, headache, lightheadedness/dizziness, abdominal pain or urinary symptoms.   ER work-up was remarkable for right upper lobe infiltrate, SARS-CoV-2 positive on 5/2/2021. Blood work was unremarkable. Patient's O2 sat ranging from 91 to 95% on room air at rest but dropped to 88 to 90% with minimal exertion. \"    Hospital Course:  Admitted with [facility]. He was started on Decadron. remdesivir not appropriate given >6d from symptom onset. Plasma not recommended anymore. He improved, but slow to wean oxygen. He is feeling better on 2L today sat 95%. Will try oxygen eval.  Pt steady trend towards improvement and no reason to think he wouldn't continue on that course so will aim for discharge today, possibly with oxygen. RT eval ordered. Follow up with PCP 10d. Disposition: Home or Self Care  Activity: Activity as tolerated, pace to exertion  Diet: DIET CARDIAC Regular; 3-4 GM (GAYE)  Code Status: Full Code    Follow Up Orders: Follow-up Appointments   Procedures    FOLLOW UP VISIT Appointment in: Ten Days Follow up [facility] hospitalization     Follow up [facility] hospitalization     Standing Status:   Standing     Number of Occurrences:   1     Order Specific Question:   Appointment in     Answer:   Ten Days       Follow-up Information     Follow up With Specialties Details Why Contact Info    Carmen Coyle MD Family Medicine In 10 days COVID follow up [place] [address] [address]  [phone]            Plan was discussed with pt. All questions answered. Patient was stable at time of discharge. Given instructions to call a physician or return if any concerns. Discharge summary and encounter summary was sent to PCP electronically via \"Comm Mgt\" link in [employer], if possible. Discharge Info:   Current Discharge Medication List      START taking these medications    Details   dexAMETHasone (DECADRON) 6 mg tablet Take 1 Tab by mouth daily for 4 days. Qty: 4 Tab, Refills: 0  Start date: 5/7/2021, End date: 5/11/2021      guaiFENesin ER (MUCINEX) 600 mg ER tablet Take 1 Tab by mouth every twelve (12) hours for 7 days.   Qty: 14 Tab, Refills: 0  Start date: 5/7/2021, End date: 5/14/2021      guaiFENesin-dextromethorphan (ROBITUSSIN DM) 100-10 mg/5 mL syrup Take 10 mL by mouth four (4) times daily as needed for Cough or Congestion for up to 7 days. Qty: 1 Bottle, Refills: 0  Start date: 5/7/2021, End date: 5/14/2021      albuterol (PROVENTIL HFA, VENTOLIN HFA, PROAIR HFA) 90 mcg/actuation inhaler Take 2 Puffs by inhalation every four (4) hours as needed for Wheezing or Shortness of Breath. Qty: 1 Inhaler, Refills: 1  Start date: 5/7/2021         CONTINUE these medications which have NOT CHANGED    Details   lisinopril (PRINIVIL, ZESTRIL) 40 mg tablet Take 40 mg by mouth daily. ondansetron (ZOFRAN ODT) 8 mg disintegrating tablet Take 1 Tab by mouth every twelve (12) hours as needed for Nausea. Qty: 10 Tab, Refills: 0      oxyCODONE-acetaminophen (PERCOCET) 7.5-325 mg per tablet Take 1 Tab by mouth every four (4) hours as needed for Pain. Max Daily Amount: 6 Tabs. Qty: 15 Tab, Refills: 0             Procedures done this admission:  * No surgery found *    Consults this admission:  None    Echocardiogram/EKG results:  No results found for this visit on 05/03/21. Results for orders placed or performed during the hospital encounter of 05/02/21   EKG, 12 LEAD, INITIAL   Result Value Ref Range    Ventricular Rate 101 BPM    Atrial Rate 101 BPM    P-R Interval 140 ms    QRS Duration 78 ms    Q-T Interval 310 ms    QTC Calculation (Bezet) 401 ms    Calculated P Axis 50 degrees    Calculated R Axis 24 degrees    Calculated T Axis 5 degrees    Diagnosis         Sinus tachycardia  Cannot rule out Anterior infarct , age undetermined  Abnormal ECG  No previous ECGs available  Confirmed by [employer] ([id number]) on 5/3/2021 5:45:33 PM         Diagnostic Imaging/Tests:   Xr Chest Sngl V    Result Date: 5/2/2021  Chest X-ray INDICATION: Shortness of breath and fever A portable AP view of the chest was obtained.  FINDINGS: There is infiltrate in the right upper lobe. Left lung is clear. The heart size is normal.  The bony thorax is intact. Right upper lobe infiltrate       All Micro Results     None          SARS-CoV-2 Lab Results  \"Novel Coronavirus\" Test: No results found for: COV2NT   \"Emergent Disease\" Test: No results found for: EDPR  \"SARS-COV-2\" Test: No results found for: XGCOVT  Rapid Test:   Lab Results   Component Value Date/Time    COVR Detected (AA) 05/02/2021 05:22 PM            Labs: Results:       BMP, Mg, Phos No results for input(s): NA, K, CL, CO2, AGAP, BUN, CREA, CA, GLU, MG, PHOS in the last 72 hours. CBC No results for input(s): WBC, RBC, HGB, HCT, PLT, GRANS, LYMPH, EOS, MONOS, BASOS, IG, ANEU, ABL, DAINA, ABM, ABB, AIG, HGBEXT, HCTEXT, PLTEXT, HGBEXT, HCTEXT, PLTEXT in the last 72 hours. LFT No results for input(s): ALT, TBIL, AP, TP, ALB, GLOB, AGRAT in the last 72 hours. No lab exists for component: SGOT, GPT   Cardiac Testing No results found for: BNPP, BNP, CPK, RCK1, RCK2, RCK3, RCK4, CKMB, CKNDX, CKND1, TROPT, TROIQ   Coagulation Tests No results found for: PTP, INR, APTT, INREXT, INREXT   A1c No results found for: HBA1C, HGBE8, VDZ0DQTS, ATN4UKSM   Lipid Panel No results found for: CHOL, CHOLPOCT, CHOLX, CHLST, CHOLV, 905250, HDL, HDLP, LDL, LDLC, DLDLP, 675557, VLDLC, VLDL, TGLX, TRIGL, TRIGP, TGLPOCT, CHHD, CHHDX   Thyroid Panel No results found for: TSH, T4, FT4, TT3, T3U, TSHEXT, TSHEXT     Most Recent UA No results found for: COLOR, APPRN, REFSG, ARIADNE, PROTU, GLUCU, KETU, BILU, BLDU, UROU, ILANA, LEUKU, WBCU, RBCU, UEPI, BACTU, CASTS, UCRY, MUCUS, UCOM       All Labs from Last 24 Hrs:  No results found for this or any previous visit (from the past 24 hour(s)).     Discharge Exam:  Patient Vitals for the past 24 hrs:   Temp Pulse Resp BP SpO2   05/07/21 0751 97.7 °F (36.5 °C) (!) 56 18 121/80 95 %   05/07/21 0415 97.5 °F (36.4 °C) (!) 56 18 122/78 95 %   05/07/21 0004 97.9 °F (36.6 °C) (!) 53 18 133/76 91 % 05/06/21 1849 97.7 °F (36.5 °C) (!) 58 16 113/67 98 %   05/06/21 1546 97.9 °F (36.6 °C) (!) 59 16 122/76 95 %   05/06/21 1116 97.9 °F (36.6 °C) (!) 51 16 122/78 97 %     Oxygen Therapy  O2 Sat (%): 95 % (05/07/21 0751)  Pulse via Oximetry: 80 beats per minute (05/04/21 2026)  O2 Device: Nasal cannula (05/07/21 0751)  Skin Assessment: Clean, dry, & intact (05/04/21 1910)  O2 Flow Rate (L/min): 2 l/min (05/07/21 0751)    Estimated body mass index is 38.01 kg/m² as calculated from the following:    Height as of this encounter: 5' 8\" (1.727 m). Weight as of this encounter: 113.4 kg (250 lb). No intake or output data in the 24 hours ending 05/07/21 1007    *Note that automatically entered I/Os may not be accurate; dependent on patient compliance with collection and accurate  by assistants. General:          Well nourished. No overt distress  CV:                  RRR. No edema. No JVD  Lungs:             Even, Unlabored  Abdomen:        nondistended. Extremities:     Warm and dry. No cyanosis   Skin:                No rashes. Normal coloration  Neuro:             No gross focal deficits.      Current Med List in Hospital:   Current Facility-Administered Medications   Medication Dose Route Frequency    alcohol 62% (NOZIN) nasal  1 Ampule  1 Ampule Topical Q12H    dexAMETHasone (DECADRON) tablet 6 mg  6 mg Oral DAILY    enoxaparin (LOVENOX) injection 40 mg  40 mg SubCUTAneous Q24H    sodium chloride (NS) flush 5-40 mL  5-40 mL IntraVENous Q8H    sodium chloride (NS) flush 5-40 mL  5-40 mL IntraVENous PRN    acetaminophen (TYLENOL) tablet 650 mg  650 mg Oral Q6H PRN    Or    acetaminophen (TYLENOL) suppository 650 mg  650 mg Rectal Q6H PRN    cholecalciferol (VITAMIN D3) (1000 Units /25 mcg) tablet 2,000 Units  2,000 Units Oral DAILY    famotidine (PEPCID) tablet 20 mg  20 mg Oral BID    guaiFENesin-dextromethorphan (ROBITUSSIN DM) 100-10 mg/5 mL syrup 5 mL  5 mL Oral BID and QHS  polyethylene glycol (MIRALAX) packet 17 g  17 g Oral DAILY PRN    promethazine (PHENERGAN) tablet 12.5 mg  12.5 mg Oral Q6H PRN    Or    ondansetron (ZOFRAN) injection 4 mg  4 mg IntraVENous Q6H PRN    aspirin chewable tablet 81 mg  81 mg Oral DAILY    guaiFENesin ER (MUCINEX) tablet 600 mg  600 mg Oral Q12H    lisinopriL (PRINIVIL, ZESTRIL) tablet 40 mg  40 mg Oral DAILY    temazepam (RESTORIL) capsule 15 mg  15 mg Oral QHS PRN       Allergies   Allergen Reactions    Erythromycin Nausea and Vomiting       There is no immunization history on file for this patient. Time spent in patient discharge planning and coordination 35 minutes.     Signed:  Dae Escobar MD

## 2021-05-07 NOTE — DISCHARGE INSTRUCTIONS
Patient Education   Learning About Coronavirus [phone])  Coronavirus [phone]): Overview  What is coronavirus ([id number])? The coronavirus disease (COVID-19) is caused by a virus. It is an illness that was first found in [place], [place], in December 2019. It has since spread worldwide. The virus can cause fever, cough, and trouble breathing. In severe cases, it can cause pneumonia and make it hard to breathe without help. It can cause death. Coronaviruses are a large group of viruses. They cause the common cold. They also cause more serious illnesses like [place] East respiratory syndrome (MERS) and severe acute respiratory syndrome (SARS). COVID-19 is caused by a novel coronavirus. That means it's a new type that has not been seen in people before. This virus spreads person-to-person through droplets from coughing and sneezing. It can also spread when you are close to someone who is infected. And it can spread when you touch something that has the virus on it, such as a doorknob or a tabletop. What can you do to protect yourself from coronavirus (COVID-19)? The best way to protect yourself from getting sick is to:  · Avoid areas where there is an outbreak. · Avoid contact with people who may be infected. · Wash your hands often with soap or alcohol-based hand sanitizers. · Avoid crowds and try to stay at least 6 feet away from other people. · Wash your hands often, especially after you cough or sneeze. Use soap and water, and scrub for at least 20 seconds. If soap and water aren't available, use an alcohol-based hand . · Avoid touching your mouth, nose, and eyes. What can you do to avoid spreading the virus to others? To help avoid spreading the virus to others:  · Cover your mouth with a tissue when you cough or sneeze. Then throw the tissue in the trash. · Use a disinfectant to clean things that you touch often. · Stay home if you are sick or have been exposed to the virus.  Don't go to school, work, or public areas. And don't use public transportation. · If you are sick:  ? Leave your home only if you need to get medical care. But call the doctor's office first so they know you're coming. And wear a face mask, if you have one.  ? If you have a face mask, wear it whenever you're around other people. It can help stop the spread of the virus when you cough or sneeze. ? Clean and disinfect your home every day. Use household  and disinfectant wipes or sprays. Take special care to clean things that you grab with your hands. These include doorknobs, remote controls, phones, and handles on your refrigerator and microwave. And don't forget countertops, tabletops, bathrooms, and computer keyboards. When to call for help  Call 911 anytime you think you may need emergency care. For example, call if:  · You have severe trouble breathing. (You can't talk at all.)  · You have constant chest pain or pressure. · You are severely dizzy or lightheaded. · You are confused or can't think clearly. · Your face and lips have a blue color. · You pass out (lose consciousness) or are very hard to wake up. Call your doctor now if you develop symptoms such as:  · Shortness of breath. · Fever. · Cough. If you need to get care, call ahead to the doctor's office for instructions before you go. Make sure you wear a face mask, if you have one, to prevent exposing other people to the virus. Where can you get the latest information? The following health organizations are tracking and studying this virus. Their websites contain the most up-to-date information. [facility] also learn what to do if you think you may have been exposed to the virus. · U.[place]. Centers for Disease Control and Prevention (CDC): The CDC provides updated news about the disease and travel advice. The website also tells you how to prevent the spread of infection.  www.cdc.gov  · World Health Organization [facility]): WHO offers information about the virus outbreaks. WHO also has travel advice. www.who.int  Current as of: April 1, 2020               Content Version: 12.4  © 2006-2020 [employer]. Care instructions adapted under license by your healthcare professional. If you have questions about a medical condition or this instruction, always ask your healthcare professional. [address] any warranty or liability for your use of this information. Patient Education        Danisha. Sel[address] 28 After Treatment for COVID-19: Care Instructions  Overview     You are being sent home from the hospital after being treated for COVID-19. Being in the hospital can be hard, especially if you've been in the intensive care unit (ICU). Even though you're going home, you probably don't feel well yet. Healing from COVID-19 takes time. You may feel very tired for weeks or months afterward, especially if you were on a ventilator. It will take time to get back to your old level of activity. Some people may have long-lasting health problems. But most people can look forward to feeling a little better every day. If you were on a ventilator, your throat may be sore and your voice hoarse or raspy for a while. After leaving the hospital, some people have feelings of anxiety and depression. They may have nightmares. Or in their mind they may relive events that happened in the hospital (flashbacks). You can always reach out to your doctor if you're having trouble with these symptoms. Your doctor will tell you if you need to isolate yourself at home, and when you can end isolation. Follow-up care is a key part of your treatment and safety. Be sure to make and go to all appointments, and call your doctor if you are having problems. It's also a good idea to know your test results and keep a list of the medicines you take. How can you care for yourself at home? · Get plenty of rest. It can help you feel better.   · Be kind to yourself if it's taking longer than you expected to feel better. You've been through a stressful time. · Get up and walk around every hour or two while you're resting. Slowly increase your activity as you start to feel better. · Eat healthy foods. · Drink plenty of fluids. If you have kidney, heart, or liver disease and have to limit fluids, talk with your doctor before you increase the amount of fluids you drink. · Take acetaminophen (such as Tylenol) to reduce a fever. It may also help with muscle aches. Read and follow all instructions on the label. If you are in isolation after you get home  · Wear a cloth face cover when you are around other people. It can help stop the spread of the virus when you cough or sneeze. · Limit contact with people in your home. If possible, stay in a separate bedroom and use a separate bathroom. · If you have to leave home, avoid crowds and try to stay at least 6 feet away from other people. · Avoid contact with pets and other animals. · Cover your mouth and nose with a tissue when you cough or sneeze. Then throw it in the trash right away. · Wash your hands often, especially after you cough or sneeze. Use soap and water, and scrub for at least 20 seconds. If soap and water aren't available, use an alcohol-based hand . · Don't share personal household items. These include bedding, towels, cups and glasses, and eating utensils. · [address] in the warmest water allowed for the fabric type, and dry it completely. It's okay to wash other people's laundry with yours. · Clean and disinfect your home every day. Use household  and disinfectant wipes or sprays. Take special care to clean things that you grab with your hands. These include doorknobs, remote controls, phones, and handles on your refrigerator and microwave. And don't forget countertops, tabletops, bathrooms, and computer keyboards. When should you call for help?    Call 911 anytime you think you may need emergency care. For example, call if you have life-threatening symptoms, such as:    · You have severe trouble breathing. (You can't talk at all.)     · You have constant chest pain or pressure.     · You are severely dizzy or lightheaded.     · You are confused or can't think clearly.     · Your face and lips have a blue color.     · You passed out (lost consciousness) or are very hard to wake up. Call your doctor now or seek immediate medical care if:    · You have moderate trouble breathing. (You can't speak a full sentence.)     · You are coughing up blood (more than about 1 teaspoon).     · You have signs of low blood pressure. These include feeling lightheaded; being too weak to stand; and having cold, pale, clammy skin. Watch closely for changes in your health, and be sure to contact your doctor if:    · Your symptoms get worse.     · You are not getting better as expected.     · You have new or worse symptoms of anxiety, depression, nightmares, or flashbacks. Call before you go to the doctor's office. Follow their instructions. And wear a cloth face cover. Current as of: December 18, 2020               Content Version: 12.8  © 1923-1913 [employer] Care instructions adapted under license by [employer] (which disclaims liability or warranty for this information). If you have questions about a medical condition or this instruction, always ask your healthcare professional. [place] [address] any warranty or liability for your use of this information.